# Patient Record
Sex: FEMALE | Race: WHITE | NOT HISPANIC OR LATINO | Employment: OTHER | URBAN - METROPOLITAN AREA
[De-identification: names, ages, dates, MRNs, and addresses within clinical notes are randomized per-mention and may not be internally consistent; named-entity substitution may affect disease eponyms.]

---

## 2017-03-01 ENCOUNTER — LAB VISIT (OUTPATIENT)
Dept: LAB | Facility: HOSPITAL | Age: 72
End: 2017-03-01
Attending: INTERNAL MEDICINE
Payer: MEDICARE

## 2017-03-01 DIAGNOSIS — R53.83 FATIGUE, UNSPECIFIED TYPE: ICD-10-CM

## 2017-03-01 DIAGNOSIS — E78.9 LIPID DISORDER: ICD-10-CM

## 2017-03-01 LAB
ALBUMIN SERPL BCP-MCNC: 3.5 G/DL
ALP SERPL-CCNC: 89 U/L
ALT SERPL W/O P-5'-P-CCNC: 16 U/L
ANION GAP SERPL CALC-SCNC: 7 MMOL/L
AST SERPL-CCNC: 20 U/L
BASOPHILS # BLD AUTO: 0.06 K/UL
BASOPHILS NFR BLD: 0.8 %
BILIRUB SERPL-MCNC: 0.7 MG/DL
BUN SERPL-MCNC: 16 MG/DL
CALCIUM SERPL-MCNC: 9.5 MG/DL
CHLORIDE SERPL-SCNC: 103 MMOL/L
CHOLEST/HDLC SERPL: 2.7 {RATIO}
CO2 SERPL-SCNC: 29 MMOL/L
CREAT SERPL-MCNC: 0.8 MG/DL
DIFFERENTIAL METHOD: NORMAL
EOSINOPHIL # BLD AUTO: 0.4 K/UL
EOSINOPHIL NFR BLD: 4.5 %
ERYTHROCYTE [DISTWIDTH] IN BLOOD BY AUTOMATED COUNT: 13.7 %
EST. GFR  (AFRICAN AMERICAN): >60 ML/MIN/1.73 M^2
EST. GFR  (NON AFRICAN AMERICAN): >60 ML/MIN/1.73 M^2
GLUCOSE SERPL-MCNC: 80 MG/DL
HCT VFR BLD AUTO: 42.9 %
HDL/CHOLESTEROL RATIO: 36.4 %
HDLC SERPL-MCNC: 184 MG/DL
HDLC SERPL-MCNC: 67 MG/DL
HGB BLD-MCNC: 14.1 G/DL
LDLC SERPL CALC-MCNC: 93 MG/DL
LYMPHOCYTES # BLD AUTO: 2.2 K/UL
LYMPHOCYTES NFR BLD: 29.1 %
MCH RBC QN AUTO: 30.2 PG
MCHC RBC AUTO-ENTMCNC: 32.9 %
MCV RBC AUTO: 92 FL
MONOCYTES # BLD AUTO: 0.7 K/UL
MONOCYTES NFR BLD: 9.5 %
NEUTROPHILS # BLD AUTO: 4.3 K/UL
NEUTROPHILS NFR BLD: 55.7 %
NONHDLC SERPL-MCNC: 117 MG/DL
PLATELET # BLD AUTO: 280 K/UL
PMV BLD AUTO: 10.8 FL
POTASSIUM SERPL-SCNC: 4.1 MMOL/L
PROT SERPL-MCNC: 6.9 G/DL
RBC # BLD AUTO: 4.67 M/UL
SODIUM SERPL-SCNC: 139 MMOL/L
T4 FREE SERPL-MCNC: 1.06 NG/DL
TRIGL SERPL-MCNC: 120 MG/DL
TSH SERPL DL<=0.005 MIU/L-ACNC: 1.34 UIU/ML
WBC # BLD AUTO: 7.7 K/UL

## 2017-03-01 PROCEDURE — 80053 COMPREHEN METABOLIC PANEL: CPT

## 2017-03-01 PROCEDURE — 85025 COMPLETE CBC W/AUTO DIFF WBC: CPT

## 2017-03-01 PROCEDURE — 84443 ASSAY THYROID STIM HORMONE: CPT

## 2017-03-01 PROCEDURE — 36415 COLL VENOUS BLD VENIPUNCTURE: CPT | Mod: PO

## 2017-03-01 PROCEDURE — 80061 LIPID PANEL: CPT

## 2017-03-01 PROCEDURE — 84439 ASSAY OF FREE THYROXINE: CPT

## 2017-03-07 ENCOUNTER — OFFICE VISIT (OUTPATIENT)
Dept: INTERNAL MEDICINE | Facility: CLINIC | Age: 72
End: 2017-03-07
Payer: MEDICARE

## 2017-03-07 VITALS
WEIGHT: 147.69 LBS | HEART RATE: 64 BPM | DIASTOLIC BLOOD PRESSURE: 72 MMHG | RESPIRATION RATE: 16 BRPM | SYSTOLIC BLOOD PRESSURE: 150 MMHG | BODY MASS INDEX: 27.88 KG/M2 | HEIGHT: 61 IN | TEMPERATURE: 98 F

## 2017-03-07 DIAGNOSIS — E78.9 LIPID DISORDER: ICD-10-CM

## 2017-03-07 DIAGNOSIS — Z23 NEED FOR VACCINATION WITH 13-POLYVALENT PNEUMOCOCCAL CONJUGATE VACCINE: ICD-10-CM

## 2017-03-07 DIAGNOSIS — J34.9 SINUS DISORDER: ICD-10-CM

## 2017-03-07 DIAGNOSIS — Z12.31 ENCOUNTER FOR SCREENING MAMMOGRAM FOR MALIGNANT NEOPLASM OF BREAST: ICD-10-CM

## 2017-03-07 DIAGNOSIS — Z00.00 ANNUAL PHYSICAL EXAM: Primary | ICD-10-CM

## 2017-03-07 DIAGNOSIS — Z12.39 SCREENING FOR BREAST CANCER: ICD-10-CM

## 2017-03-07 PROCEDURE — 90670 PCV13 VACCINE IM: CPT | Mod: PBBFAC,PO | Performed by: INTERNAL MEDICINE

## 2017-03-07 PROCEDURE — 99999 PR PBB SHADOW E&M-EST. PATIENT-LVL III: CPT | Mod: PBBFAC,,, | Performed by: INTERNAL MEDICINE

## 2017-03-07 PROCEDURE — 99213 OFFICE O/P EST LOW 20 MIN: CPT | Mod: PBBFAC,PO,25 | Performed by: INTERNAL MEDICINE

## 2017-03-07 PROCEDURE — 99214 OFFICE O/P EST MOD 30 MIN: CPT | Mod: S$PBB,,, | Performed by: INTERNAL MEDICINE

## 2017-03-07 NOTE — MR AVS SNAPSHOT
[place] [facility]   [facility]  [place] [address]  Phone: [phone]  Fax: [phone]                  Laurie Hicks   3/7/2017 10:00 AM   Office Visit    Description:  Female : 1945   Provider:  Narinder Saldivar MD   Department:  [place] [facility]           Reason for Visit     Annual Exam           Diagnoses this Visit        Comments    Annual physical exam    -  Primary     Lipid disorder         Sinus disorder         Need for vaccination with 13-polyvalent pneumococcal conjugate vaccine         Screening for breast cancer         Encounter for screening mammogram for malignant neoplasm of breast                To Do List           Future Appointments        Provider Department Dept Phone    3/7/2017 10:00 AM Narinder Saldivar MD [facility] [phone]    3/15/2017 10:00 AM [facility] [phone]      Goals (5 Years of Data)     None      Follow-Up and Disposition     Return in about 1 year (around 3/7/2018).      [facility]     [facility] -  Assistance  Registered nurses in the [facility] provide clinical advisement, health education, appointment booking, and other advisory services.  Call for this free service at [phone].             Medications           Message regarding Medications     Verify the changes and/or additions to your medication regime listed below are the same as discussed with your clinician today.  If any of these changes or additions are incorrect, please notify your healthcare provider.             Verify that the below list of medications is an accurate representation of the medications you are currently taking.  If none reported, the list may be blank. If incorrect, please contact your healthcare provider. Carry this list with you in case of emergency.           Current Medications     aspirin (ECOTRIN) 81 MG EC tablet Take 81  "mg by mouth once daily.    benzonatate (TESSALON) 200 MG capsule Take 200 mg by mouth 3 (three) times daily as needed for Cough.    levocetirizine (XYZAL) 5 MG tablet Take 5 mg by mouth every evening.    multivitamin (THERAGRAN) per tablet Take 1 tablet by mouth once daily.    ipratropium (ATROVENT) 0.06 % nasal spray            Clinical Reference Information           Your Vitals Were     BP Pulse Temp Resp Height Weight    150/72 (BP Location: Right arm, Patient Position: Sitting, BP Method: Manual) 64 98.3 °F (36.8 °C) (Oral) 16 5' 1" (1.549 m) 67 kg (147 lb 11.3 oz)    BMI                27.91 kg/m2          Blood Pressure          Most Recent Value    BP  (!)  150/72      Allergies as of 3/7/2017     Pcn [Penicillins]    Sulfa (Sulfonamide Antibiotics)      Immunizations Administered on Date of Encounter - 3/7/2017     Name Date Dose VIS Date Route    Pneumococcal Conjugate - 13 Valent 3/7/2017 0.5 mL 11/5/2015 Intramuscular      Orders Placed During Today's Visit      Normal Orders This Visit    Pneumococcal Conjugate Vaccine (13 Valent) (IM)     Future Labs/Procedures Expected by Expires    Mammo Digital Screening Bilat with CAD  3/7/2017 5/8/2018      Language Assistance Services     ATTENTION: Language assistance services are available, free of charge. Please call [phone].      ATENCIÓN: Si habla anders, tiene a watt disposición servicios gratuitos de asistencia lingüística. Llame al [phone].     [facility] Ý: N?u b?n nói Ti?ng Vi?t, có các d?ch v? h? tr? ngôn ng? mi?n phí dành cho b?n. G?i s? [phone].         [place] [facility] complies with applicable Federal civil rights laws and does not discriminate on the basis of race, color, national origin, age, disability, or sex.        "

## 2017-03-07 NOTE — PROGRESS NOTES
Subjective:       Patient ID: Laurie Hicks is a 71 y.o. female.    Chief Complaint: Annual Exam (with labs to review)  HISTORY OF PRESENT ILLNESS:  A 71-year-old white female patient of mine coming   in for annual review and has been feeling well.  She sees Dr. Whitman, who is the   ENT at [facility] for allergies and infections.  She wanted Prevnar, which   was ordered.  She had lab work done prior to this visit showing normal thyroid   function.  Chemistries, CBC, lipids and urinalysis.    PHYSICAL EXAMINATION:  VITAL SIGNS:  Normal.  SKIN:  Fair and healthy.  HEENT:  Clear.  NECK:  Shows no adenopathy, thyroid enlargement or bruit.  CHEST:  Clear.  HEART:  There is no murmur or gallop.  ABDOMEN:  Nontender without any organomegaly.  BREASTS:  Examined.  She has no mass to palpation.  EXTREMITIES:  Show normal muscles, joints, pulses.  NEUROLOGIC:  Normal.    She does have spider veins on her left leg, which she does not like, but they   are not giving her any symptoms, and I told her they were best left alone.    IMPRESSION:  1.  Spider veins.  2.  Sinus allergy.  3.  Normal physical exam.    I will see her again in one year.      KIERAN/BRIAN  dd: 03/12/2017 22:24:49 (CDT)  td: 03/13/2017 07:07:11 (CDT)  Doc ID   #[id number]  Job ID #[id number]    CC:     Dict [id number]  [facility] [place]  Review of Systems   Constitutional: Negative.    HENT: Negative for congestion, hearing loss, sinus pressure, sneezing, sore throat and voice change.    Eyes: Negative for visual disturbance.   Respiratory: Negative for cough, chest tightness and shortness of breath.    Cardiovascular: Negative.  Negative for chest pain, palpitations and leg swelling.   Gastrointestinal: Negative.    Genitourinary: Negative for difficulty urinating, dysuria, flank pain, frequency, hematuria, menstrual problem, pelvic pain, urgency, vaginal bleeding and vaginal discharge.   Musculoskeletal: Negative.  Negative for neck pain and neck stiffness.   Skin: Negative.     Neurological: Negative.  Negative for dizziness, seizures, light-headedness, numbness and headaches.   Psychiatric/Behavioral: Negative for agitation, behavioral problems, confusion and sleep disturbance. The patient is not nervous/anxious.        Objective:      Physical Exam   Constitutional: She is oriented to person, place, and time. She appears well-developed and well-nourished.   Eyes: EOM are normal. Pupils are equal, round, and reactive to light.   Neck: Normal range of motion. Neck supple. No JVD present. No thyromegaly present.   Cardiovascular: Normal rate, regular rhythm, normal heart sounds and intact distal pulses.  Exam reveals no gallop.    No murmur heard.  Pulmonary/Chest: Breath sounds normal. She has no wheezes. She has no rales.   Abdominal: Soft. Bowel sounds are normal. She exhibits no mass. There is no tenderness.   Musculoskeletal: Normal range of motion.   Lymphadenopathy:     She has no cervical adenopathy.   Neurological: She is alert and oriented to person, place, and time. She has normal reflexes. No cranial nerve deficit.   Skin: No rash noted. No erythema.   Psychiatric: She has a normal mood and affect. Judgment normal.       Assessment:       1. Annual physical exam    2. Lipid disorder    3. Sinus disorder        Plan:

## 2017-03-15 ENCOUNTER — HOSPITAL ENCOUNTER (OUTPATIENT)
Dept: RADIOLOGY | Facility: HOSPITAL | Age: 72
Discharge: HOME | End: 2017-03-15
Attending: INTERNAL MEDICINE
Payer: MEDICARE

## 2017-03-15 DIAGNOSIS — Z12.39 SCREENING FOR BREAST CANCER: ICD-10-CM

## 2017-03-15 DIAGNOSIS — Z12.31 ENCOUNTER FOR SCREENING MAMMOGRAM FOR MALIGNANT NEOPLASM OF BREAST: ICD-10-CM

## 2017-03-15 PROCEDURE — 77067 SCR MAMMO BI INCL CAD: CPT | Mod: TC

## 2017-03-15 PROCEDURE — 77067 SCR MAMMO BI INCL CAD: CPT | Mod: 26,,, | Performed by: RADIOLOGY

## 2017-03-15 PROCEDURE — 77063 BREAST TOMOSYNTHESIS BI: CPT | Mod: 26,,, | Performed by: RADIOLOGY

## 2017-11-21 ENCOUNTER — TELEPHONE (OUTPATIENT)
Dept: INTERNAL MEDICINE | Facility: CLINIC | Age: 72
End: 2017-11-21

## 2017-11-21 DIAGNOSIS — R53.83 FATIGUE, UNSPECIFIED TYPE: Primary | ICD-10-CM

## 2017-11-21 DIAGNOSIS — R79.9 ABNORMAL FINDING OF BLOOD CHEMISTRY: ICD-10-CM

## 2017-11-21 NOTE — TELEPHONE ENCOUNTER
----- Message from Jackie Raya sent at 11/21/2017 10:12 AM CST -----  Contact: self/[phone]  Patient called in regards needing to place an order for labs for physical on 03/12/18. Please call and advise.       Thank you

## 2018-01-16 ENCOUNTER — LAB VISIT (OUTPATIENT)
Dept: LAB | Facility: HOSPITAL | Age: 73
End: 2018-01-16
Attending: INTERNAL MEDICINE
Payer: MEDICARE

## 2018-01-16 DIAGNOSIS — R79.9 ABNORMAL FINDING OF BLOOD CHEMISTRY: ICD-10-CM

## 2018-01-16 DIAGNOSIS — R53.83 FATIGUE, UNSPECIFIED TYPE: ICD-10-CM

## 2018-01-16 LAB
ALBUMIN SERPL BCP-MCNC: 3.4 G/DL
ALP SERPL-CCNC: 100 U/L
ALT SERPL W/O P-5'-P-CCNC: 16 U/L
ANION GAP SERPL CALC-SCNC: 8 MMOL/L
AST SERPL-CCNC: 18 U/L
BASOPHILS # BLD AUTO: 0.05 K/UL
BASOPHILS NFR BLD: 0.7 %
BILIRUB SERPL-MCNC: 0.9 MG/DL
BUN SERPL-MCNC: 19 MG/DL
CALCIUM SERPL-MCNC: 9.7 MG/DL
CHLORIDE SERPL-SCNC: 103 MMOL/L
CHOLEST SERPL-MCNC: 185 MG/DL
CHOLEST/HDLC SERPL: 2.6 {RATIO}
CO2 SERPL-SCNC: 28 MMOL/L
CREAT SERPL-MCNC: 0.8 MG/DL
DIFFERENTIAL METHOD: ABNORMAL
EOSINOPHIL # BLD AUTO: 0.2 K/UL
EOSINOPHIL NFR BLD: 3.4 %
ERYTHROCYTE [DISTWIDTH] IN BLOOD BY AUTOMATED COUNT: 13 %
EST. GFR  (AFRICAN AMERICAN): >60 ML/MIN/1.73 M^2
EST. GFR  (NON AFRICAN AMERICAN): >60 ML/MIN/1.73 M^2
GLUCOSE SERPL-MCNC: 84 MG/DL
HCT VFR BLD AUTO: 43.2 %
HDLC SERPL-MCNC: 71 MG/DL
HDLC SERPL: 38.4 %
HGB BLD-MCNC: 14 G/DL
IMM GRANULOCYTES # BLD AUTO: 0.07 K/UL
IMM GRANULOCYTES NFR BLD AUTO: 1 %
LDLC SERPL CALC-MCNC: 96.4 MG/DL
LYMPHOCYTES # BLD AUTO: 1.5 K/UL
LYMPHOCYTES NFR BLD: 22.6 %
MCH RBC QN AUTO: 30 PG
MCHC RBC AUTO-ENTMCNC: 32.4 G/DL
MCV RBC AUTO: 93 FL
MONOCYTES # BLD AUTO: 0.7 K/UL
MONOCYTES NFR BLD: 9.7 %
NEUTROPHILS # BLD AUTO: 4.2 K/UL
NEUTROPHILS NFR BLD: 62.6 %
NONHDLC SERPL-MCNC: 114 MG/DL
NRBC BLD-RTO: 0 /100 WBC
PLATELET # BLD AUTO: 335 K/UL
PMV BLD AUTO: 10.5 FL
POTASSIUM SERPL-SCNC: 4.5 MMOL/L
PROT SERPL-MCNC: 7.3 G/DL
RBC # BLD AUTO: 4.66 M/UL
SODIUM SERPL-SCNC: 139 MMOL/L
T4 FREE SERPL-MCNC: 1.2 NG/DL
TRIGL SERPL-MCNC: 88 MG/DL
TSH SERPL DL<=0.005 MIU/L-ACNC: 0.64 UIU/ML
WBC # BLD AUTO: 6.78 K/UL

## 2018-01-16 PROCEDURE — 84439 ASSAY OF FREE THYROXINE: CPT

## 2018-01-16 PROCEDURE — 80061 LIPID PANEL: CPT

## 2018-01-16 PROCEDURE — 80053 COMPREHEN METABOLIC PANEL: CPT

## 2018-01-16 PROCEDURE — 84443 ASSAY THYROID STIM HORMONE: CPT

## 2018-01-16 PROCEDURE — 85025 COMPLETE CBC W/AUTO DIFF WBC: CPT

## 2018-01-16 PROCEDURE — 36415 COLL VENOUS BLD VENIPUNCTURE: CPT | Mod: PO

## 2018-03-12 ENCOUNTER — OFFICE VISIT (OUTPATIENT)
Dept: INTERNAL MEDICINE | Facility: CLINIC | Age: 73
End: 2018-03-12
Payer: MEDICARE

## 2018-03-12 VITALS
TEMPERATURE: 98 F | HEART RATE: 71 BPM | WEIGHT: 147.94 LBS | RESPIRATION RATE: 18 BRPM | DIASTOLIC BLOOD PRESSURE: 83 MMHG | SYSTOLIC BLOOD PRESSURE: 148 MMHG | HEIGHT: 61 IN | BODY MASS INDEX: 27.93 KG/M2

## 2018-03-12 DIAGNOSIS — Z12.39 SCREENING FOR BREAST CANCER: ICD-10-CM

## 2018-03-12 DIAGNOSIS — J34.9 SINUS DISORDER: ICD-10-CM

## 2018-03-12 DIAGNOSIS — Z00.00 ANNUAL PHYSICAL EXAM: Primary | ICD-10-CM

## 2018-03-12 DIAGNOSIS — E78.9 LIPID DISORDER: ICD-10-CM

## 2018-03-12 PROCEDURE — 99214 OFFICE O/P EST MOD 30 MIN: CPT | Mod: S$PBB,,, | Performed by: INTERNAL MEDICINE

## 2018-03-12 PROCEDURE — 99999 PR PBB SHADOW E&M-EST. PATIENT-LVL III: CPT | Mod: PBBFAC,,, | Performed by: INTERNAL MEDICINE

## 2018-03-12 PROCEDURE — 99213 OFFICE O/P EST LOW 20 MIN: CPT | Mod: PBBFAC,PO | Performed by: INTERNAL MEDICINE

## 2018-03-19 ENCOUNTER — HOSPITAL ENCOUNTER (OUTPATIENT)
Dept: RADIOLOGY | Facility: HOSPITAL | Age: 73
Discharge: HOME | End: 2018-03-19
Attending: INTERNAL MEDICINE
Payer: MEDICARE

## 2018-03-19 DIAGNOSIS — Z12.39 SCREENING FOR BREAST CANCER: ICD-10-CM

## 2018-03-19 PROCEDURE — 77067 SCR MAMMO BI INCL CAD: CPT | Mod: TC,PO

## 2018-03-19 PROCEDURE — 77067 SCR MAMMO BI INCL CAD: CPT | Mod: 26,,, | Performed by: RADIOLOGY

## 2018-03-25 NOTE — PROGRESS NOTES
Subjective:       Patient ID: Laurie Hicks is a 72 y.o. female.    Chief Complaint: Annual Exam  HISTORY OF PRESENT ILLNESS:  A 72-year-old white female patient of mine coming   in for annual review and has been feeling well.  She had a lab done prior to   this showing normal thyroid function, chemistries, lipids, CBC, urinalysis and   at the time of the visit had a mammogram ordered, which was done following the   visit and that was negative.  Lab was reviewed with the patient.    PHYSICAL EXAMINATION:  VITAL SIGNS:  Normal.  SKIN:  Fair and healthy.  HEENT:  Clear.  NECK:  Shows no adenopathy, thyroid enlargement or bruit.  CHEST:  Clear.  HEART:  There is no murmur or gallop.  BREASTS:  Examined.  She has no mass to palpation.  ABDOMEN:  Nontender without any organomegaly.  EXTREMITIES:  Show normal muscles, joints, pulses.  NEUROLOGIC:  Normal.    IMPRESSION:  1.  Spider veins on her legs.  No significance.  2.  Sinus allergy.  3.  Otherwise, normal exam.  I will see her again in one year if all is well.      KIERAN/BRIAN  dd: 03/25/2018 18:31:23 (CDT)  td: 03/25/2018 23:32:55 (CDT)  Doc ID   #[id number]  Job ID #[id number]    CC:     Dict [id number]  HPI  Review of Systems   Constitutional: Negative.    HENT: Negative for congestion, hearing loss, sinus pressure, sneezing, sore throat and voice change.    Eyes: Negative for visual disturbance.   Respiratory: Negative for cough, chest tightness and shortness of breath.    Cardiovascular: Negative.  Negative for chest pain, palpitations and leg swelling.   Gastrointestinal: Negative.    Genitourinary: Negative for difficulty urinating, dysuria, flank pain, frequency, hematuria, menstrual problem, pelvic pain, urgency, vaginal bleeding and vaginal discharge.   Musculoskeletal: Negative.  Negative for neck pain and neck stiffness.   Skin: Negative.    Neurological: Negative.  Negative for dizziness, seizures, light-headedness, numbness and headaches.    Psychiatric/Behavioral: Negative for agitation, behavioral problems, confusion and sleep disturbance. The patient is not nervous/anxious.        Objective:      Physical Exam   Constitutional: She is oriented to person, place, and time. She appears well-developed and well-nourished.   Eyes: EOM are normal. Pupils are equal, round, and reactive to light.   Neck: Normal range of motion. Neck supple. No JVD present. No thyromegaly present.   Cardiovascular: Normal rate, regular rhythm, normal heart sounds and intact distal pulses.  Exam reveals no gallop.    No murmur heard.  Pulmonary/Chest: Breath sounds normal. She has no wheezes. She has no rales.   Abdominal: Soft. Bowel sounds are normal. She exhibits no mass. There is no tenderness.   Musculoskeletal: Normal range of motion.   Lymphadenopathy:     She has no cervical adenopathy.   Neurological: She is alert and oriented to person, place, and time. She has normal reflexes. No cranial nerve deficit.   Skin: No rash noted. No erythema.   Psychiatric: She has a normal mood and affect. Judgment normal.       Assessment:       1. Annual physical exam    2. Lipid disorder    3. Sinus disorder    4. Screening for breast cancer        Plan:

## 2018-04-06 ENCOUNTER — OFFICE VISIT (OUTPATIENT)
Dept: DERMATOLOGY | Facility: CLINIC | Age: 73
End: 2018-04-06
Payer: MEDICARE

## 2018-04-06 DIAGNOSIS — L23.9 ALLERGIC CONTACT DERMATITIS, UNSPECIFIED TRIGGER: Primary | ICD-10-CM

## 2018-04-06 PROCEDURE — 99999 PR PBB SHADOW E&M-EST. PATIENT-LVL II: CPT | Mod: PBBFAC,,, | Performed by: DERMATOLOGY

## 2018-04-06 PROCEDURE — 99213 OFFICE O/P EST LOW 20 MIN: CPT | Mod: S$PBB,,, | Performed by: DERMATOLOGY

## 2018-04-06 PROCEDURE — 99212 OFFICE O/P EST SF 10 MIN: CPT | Mod: PBBFAC | Performed by: DERMATOLOGY

## 2018-04-06 RX ORDER — FLUOCINONIDE TOPICAL SOLUTION USP, 0.05% 0.5 MG/ML
SOLUTION TOPICAL
Qty: 60 ML | Refills: 3 | Status: SHIPPED | OUTPATIENT
Start: 2018-04-06 | End: 2019-03-18

## 2018-04-06 NOTE — PROGRESS NOTES
Subjective:       Patient ID:  Laurie Hicks is a 72 y.o. female who presents for   Chief Complaint   Patient presents with    Spot     Scalp     Spot  - Initial  Affected locations: scalp  Duration: 4 months  Signs / symptoms: asymptomatic  Aggravated by: nothing  Relieving factors/Treatments tried: nothing  Improvement on treatment: no relief      Pt states she had a spider bite on her scalp and after that, her  noticed a small area of hair loss.  Only minor itching/irritation. Pt hasn't noticed any hair loss.  She thinks her  recently changed products.    Review of Systems   Constitutional: Negative for fever, chills and fatigue.   Skin: Positive for daily sunscreen use.   Hematologic/Lymphatic: Bruises/bleeds easily.        Objective:    Physical Exam   Constitutional: She appears well-developed and well-nourished. No distress.   Neurological: She is alert and oriented to person, place, and time. She is not disoriented.   Psychiatric: She has a normal mood and affect.   Skin:   Areas Examined (abnormalities noted in diagram):   Scalp / Hair Palpated and Inspected  Head / Face Inspection Performed              Diagram Legend     Erythematous scaling macule/papule c/w actinic keratosis       Vascular papule c/w angioma      Pigmented verrucoid papule/plaque c/w seborrheic keratosis      Yellow umbilicated papule c/w sebaceous hyperplasia      Irregularly shaped tan macule c/w lentigo     1-2 mm smooth white papules consistent with Milia      Movable subcutaneous cyst with punctum c/w epidermal inclusion cyst      Subcutaneous movable cyst c/w pilar cyst      Firm pink to brown papule c/w dermatofibroma      Pedunculated fleshy papule(s) c/w skin tag(s)      Evenly pigmented macule c/w junctional nevus     Mildly variegated pigmented, slightly irregular-bordered macule c/w mildly atypical nevus      Flesh colored to evenly pigmented papule c/w intradermal nevus       Pink pearly  papule/plaque c/w basal cell carcinoma      Erythematous hyperkeratotic cursted plaque c/w SCC      Surgical scar with no sign of skin cancer recurrence      Open and closed comedones      Inflammatory papules and pustules      Verrucoid papule consistent consistent with wart     Erythematous eczematous patches and plaques     Dystrophic onycholytic nail with subungual debris c/w onychomycosis     Umbilicated papule    Erythematous-base heme-crusted tan verrucoid plaque consistent with inflamed seborrheic keratosis     Erythematous Silvery Scaling Plaque c/w Psoriasis     See annotation      Assessment / Plan:        Allergic contact dermatitis vs. Other -scalp  -     fluocinonide (LIDEX) 0.05 % external solution; AAA scalp qday - bid prn pruritus  Dispense: 60 mL; Refill: 3  Rec: trying Free and Clear shampoo    If no improvement in 2-3 months or if pt starts noticing hair loss, she will return for biopsy

## 2018-09-06 ENCOUNTER — TELEPHONE (OUTPATIENT)
Dept: INTERNAL MEDICINE | Facility: CLINIC | Age: 73
End: 2018-09-06

## 2018-09-06 NOTE — TELEPHONE ENCOUNTER
We received notice from [facility] & [facility] she got her flu and prevnar 13 shots.    I left her message that she does not need anymore pneumonia vaccines.  She actually got prevnar twice and is covered for pneumonia vaccines for lifetime.    I also states  needs the older pneumonia vaccine only when he can get. He already had the prevnar 13 one.

## 2018-11-23 ENCOUNTER — TELEPHONE (OUTPATIENT)
Dept: INTERNAL MEDICINE | Facility: CLINIC | Age: 73
End: 2018-11-23

## 2018-11-23 DIAGNOSIS — E78.9 LIPID DISORDER: ICD-10-CM

## 2018-11-23 DIAGNOSIS — R53.83 FATIGUE, UNSPECIFIED TYPE: Primary | ICD-10-CM

## 2018-11-23 NOTE — TELEPHONE ENCOUNTER
----- Message from Seda Guardado sent at 11/23/2018  9:19 AM CST -----  Contact: self  Doctor appointment and lab have been scheduled.  Please link lab orders to the lab appointment.  Date of doctor appointment:  3/18  Physical or EP:  phys  Date of lab appointment:  3/11  Comments:

## 2019-02-07 ENCOUNTER — TELEPHONE (OUTPATIENT)
Dept: DERMATOLOGY | Facility: CLINIC | Age: 74
End: 2019-02-07

## 2019-02-07 NOTE — TELEPHONE ENCOUNTER
Spoke with pt and got her scheduled to come in for an urgent rash on her face for tomorrow with Dr. Hicks.

## 2019-02-07 NOTE — TELEPHONE ENCOUNTER
----- Message from Vivien Diaz sent at 2/7/2019 10:44 AM CST -----  Contact: pt   Patient Requesting Sooner Appointment.     Reason for sooner appt.: pt is coming in for a rash on her face pt said her face is swollen started on her cheek and going down her face   When is the first available appointment? 3/28/2019  Communication Preference: pt would like to be seen tomorrow can you please call pt at [phone]  Additional Information: none    HEATHER

## 2019-02-08 ENCOUNTER — OFFICE VISIT (OUTPATIENT)
Dept: DERMATOLOGY | Facility: CLINIC | Age: 74
End: 2019-02-08
Payer: MEDICARE

## 2019-02-08 DIAGNOSIS — L23.9 ALLERGIC CONTACT DERMATITIS, UNSPECIFIED TRIGGER: Primary | ICD-10-CM

## 2019-02-08 PROCEDURE — 99212 OFFICE O/P EST SF 10 MIN: CPT | Mod: PBBFAC | Performed by: DERMATOLOGY

## 2019-02-08 PROCEDURE — 99213 OFFICE O/P EST LOW 20 MIN: CPT | Mod: S$PBB,,, | Performed by: DERMATOLOGY

## 2019-02-08 PROCEDURE — 99213 PR OFFICE/OUTPT VISIT, EST, LEVL III, 20-29 MIN: ICD-10-PCS | Mod: S$PBB,,, | Performed by: DERMATOLOGY

## 2019-02-08 PROCEDURE — 99999 PR PBB SHADOW E&M-EST. PATIENT-LVL II: CPT | Mod: PBBFAC,,, | Performed by: DERMATOLOGY

## 2019-02-08 PROCEDURE — 99999 PR PBB SHADOW E&M-EST. PATIENT-LVL II: ICD-10-PCS | Mod: PBBFAC,,, | Performed by: DERMATOLOGY

## 2019-02-08 RX ORDER — ALCLOMETASONE DIPROPIONATE 0.5 MG/G
CREAM TOPICAL 2 TIMES DAILY
Qty: 45 G | Refills: 1 | Status: SHIPPED | OUTPATIENT
Start: 2019-02-08

## 2019-02-08 NOTE — PROGRESS NOTES
Subjective:       Patient ID:  Laurie Hicks is a 73 y.o. female who presents for   Chief Complaint   Patient presents with    Rash     FAce, x 2 days, ice packs for treatment and neospoirn, swelling and redness     Rash  - Initial  Affected locations: face  Duration: 2 days  Signs / symptoms: swelling and redness  Aggravated by: nothing  Relieving factors/Treatments tried: OTC antibiotic cream (ice packs)  Improvement on treatment: significant      Rash has improved much on its own. Pt unaware of any triggers.    Review of Systems   Constitutional: Negative for fever, chills, weight loss, weight gain, fatigue, night sweats and malaise.   Skin: Positive for rash and recent sunburn. Negative for daily sunscreen use and activity-related sunscreen use.   Hematologic/Lymphatic: Does not bruise/bleed easily.        Objective:    Physical Exam   Constitutional: She appears well-developed and well-nourished. No distress.   Neurological: She is alert and oriented to person, place, and time. She is not disoriented.   Psychiatric: She has a normal mood and affect.   Skin:   Areas Examined (abnormalities noted in diagram):   Head / Face Inspection Performed  Neck Inspection Performed              Diagram Legend     Erythematous scaling macule/papule c/w actinic keratosis       Vascular papule c/w angioma      Pigmented verrucoid papule/plaque c/w seborrheic keratosis      Yellow umbilicated papule c/w sebaceous hyperplasia      Irregularly shaped tan macule c/w lentigo     1-2 mm smooth white papules consistent with Milia      Movable subcutaneous cyst with punctum c/w epidermal inclusion cyst      Subcutaneous movable cyst c/w pilar cyst      Firm pink to brown papule c/w dermatofibroma      Pedunculated fleshy papule(s) c/w skin tag(s)      Evenly pigmented macule c/w junctional nevus     Mildly variegated pigmented, slightly irregular-bordered macule c/w mildly atypical nevus      Flesh colored to evenly  pigmented papule c/w intradermal nevus       Pink pearly papule/plaque c/w basal cell carcinoma      Erythematous hyperkeratotic cursted plaque c/w SCC      Surgical scar with no sign of skin cancer recurrence      Open and closed comedones      Inflammatory papules and pustules      Verrucoid papule consistent consistent with wart     Erythematous eczematous patches and plaques     Dystrophic onycholytic nail with subungual debris c/w onychomycosis     Umbilicated papule    Erythematous-base heme-crusted tan verrucoid plaque consistent with inflamed seborrheic keratosis     Erythematous Silvery Scaling Plaque c/w Psoriasis     See annotation      Assessment / Plan:        Allergic contact dermatitis, unspecified trigger  -     alclomethasone (ACLOVATE) 0.05 % cream; Apply topically 2 (two) times daily. X 1-2 wks then prn flares  Dispense: 45 g; Refill: 1    Vanicream products provided to patient.   After rash clears, may reintroduce one product at a time.    If rash continues to recur, consider patch testing.

## 2019-03-11 ENCOUNTER — LAB VISIT (OUTPATIENT)
Dept: LAB | Facility: HOSPITAL | Age: 74
End: 2019-03-11
Attending: INTERNAL MEDICINE
Payer: MEDICARE

## 2019-03-11 DIAGNOSIS — E78.9 LIPID DISORDER: ICD-10-CM

## 2019-03-11 DIAGNOSIS — R53.83 FATIGUE, UNSPECIFIED TYPE: ICD-10-CM

## 2019-03-11 LAB
ALBUMIN SERPL BCP-MCNC: 3.8 G/DL
ALP SERPL-CCNC: 85 U/L
ALT SERPL W/O P-5'-P-CCNC: 21 U/L
ANION GAP SERPL CALC-SCNC: 8 MMOL/L
AST SERPL-CCNC: 25 U/L
BASOPHILS # BLD AUTO: 0.09 K/UL
BASOPHILS NFR BLD: 1.2 %
BILIRUB SERPL-MCNC: 0.8 MG/DL
BUN SERPL-MCNC: 14 MG/DL
CALCIUM SERPL-MCNC: 10.1 MG/DL
CHLORIDE SERPL-SCNC: 102 MMOL/L
CHOLEST SERPL-MCNC: 191 MG/DL
CHOLEST/HDLC SERPL: 2.8 {RATIO}
CO2 SERPL-SCNC: 28 MMOL/L
CREAT SERPL-MCNC: 0.8 MG/DL
DIFFERENTIAL METHOD: ABNORMAL
EOSINOPHIL # BLD AUTO: 0.4 K/UL
EOSINOPHIL NFR BLD: 4.6 %
ERYTHROCYTE [DISTWIDTH] IN BLOOD BY AUTOMATED COUNT: 13 %
EST. GFR  (AFRICAN AMERICAN): >60 ML/MIN/1.73 M^2
EST. GFR  (NON AFRICAN AMERICAN): >60 ML/MIN/1.73 M^2
GLUCOSE SERPL-MCNC: 71 MG/DL
HCT VFR BLD AUTO: 44.5 %
HDLC SERPL-MCNC: 69 MG/DL
HDLC SERPL: 36.1 %
HGB BLD-MCNC: 14.5 G/DL
IMM GRANULOCYTES # BLD AUTO: 0.06 K/UL
IMM GRANULOCYTES NFR BLD AUTO: 0.8 %
LDLC SERPL CALC-MCNC: 99.6 MG/DL
LYMPHOCYTES # BLD AUTO: 2.6 K/UL
LYMPHOCYTES NFR BLD: 33.4 %
MCH RBC QN AUTO: 30.6 PG
MCHC RBC AUTO-ENTMCNC: 32.6 G/DL
MCV RBC AUTO: 94 FL
MONOCYTES # BLD AUTO: 0.8 K/UL
MONOCYTES NFR BLD: 10.2 %
NEUTROPHILS # BLD AUTO: 3.9 K/UL
NEUTROPHILS NFR BLD: 49.8 %
NONHDLC SERPL-MCNC: 122 MG/DL
NRBC BLD-RTO: 0 /100 WBC
PLATELET # BLD AUTO: 337 K/UL
PMV BLD AUTO: 10.7 FL
POTASSIUM SERPL-SCNC: 4.1 MMOL/L
PROT SERPL-MCNC: 7.2 G/DL
RBC # BLD AUTO: 4.74 M/UL
SODIUM SERPL-SCNC: 138 MMOL/L
T4 FREE SERPL-MCNC: 1.15 NG/DL
TRIGL SERPL-MCNC: 112 MG/DL
TSH SERPL DL<=0.005 MIU/L-ACNC: 0.93 UIU/ML
WBC # BLD AUTO: 7.76 K/UL

## 2019-03-11 PROCEDURE — 80053 COMPREHEN METABOLIC PANEL: CPT

## 2019-03-11 PROCEDURE — 84439 ASSAY OF FREE THYROXINE: CPT

## 2019-03-11 PROCEDURE — 80061 LIPID PANEL: CPT

## 2019-03-11 PROCEDURE — 84443 ASSAY THYROID STIM HORMONE: CPT

## 2019-03-11 PROCEDURE — 85025 COMPLETE CBC W/AUTO DIFF WBC: CPT

## 2019-03-11 PROCEDURE — 36415 COLL VENOUS BLD VENIPUNCTURE: CPT | Mod: PO

## 2019-03-18 ENCOUNTER — OFFICE VISIT (OUTPATIENT)
Dept: INTERNAL MEDICINE | Facility: CLINIC | Age: 74
End: 2019-03-18
Payer: MEDICARE

## 2019-03-18 VITALS
DIASTOLIC BLOOD PRESSURE: 84 MMHG | BODY MASS INDEX: 28.97 KG/M2 | TEMPERATURE: 97 F | SYSTOLIC BLOOD PRESSURE: 138 MMHG | WEIGHT: 153.44 LBS | HEART RATE: 78 BPM | HEIGHT: 61 IN

## 2019-03-18 DIAGNOSIS — E78.9 LIPID DISORDER: ICD-10-CM

## 2019-03-18 DIAGNOSIS — J34.9 SINUS DISORDER: ICD-10-CM

## 2019-03-18 DIAGNOSIS — Z00.00 ANNUAL PHYSICAL EXAM: Primary | ICD-10-CM

## 2019-03-18 DIAGNOSIS — R82.90 ABNORMAL URINALYSIS: ICD-10-CM

## 2019-03-18 PROCEDURE — 99213 OFFICE O/P EST LOW 20 MIN: CPT | Mod: PBBFAC,PO | Performed by: INTERNAL MEDICINE

## 2019-03-18 PROCEDURE — 99397 PR PREVENTIVE VISIT,EST,65 & OVER: ICD-10-PCS | Mod: S$PBB,,, | Performed by: INTERNAL MEDICINE

## 2019-03-18 PROCEDURE — 99999 PR PBB SHADOW E&M-EST. PATIENT-LVL III: ICD-10-PCS | Mod: PBBFAC,,, | Performed by: INTERNAL MEDICINE

## 2019-03-18 PROCEDURE — 99397 PER PM REEVAL EST PAT 65+ YR: CPT | Mod: S$PBB,,, | Performed by: INTERNAL MEDICINE

## 2019-03-18 PROCEDURE — 99999 PR PBB SHADOW E&M-EST. PATIENT-LVL III: CPT | Mod: PBBFAC,,, | Performed by: INTERNAL MEDICINE

## 2019-03-18 PROCEDURE — 87086 URINE CULTURE/COLONY COUNT: CPT

## 2019-03-18 NOTE — PROGRESS NOTES
Subjective:       Patient ID: Laurie Hicks is a 73 y.o. female.    Chief Complaint: Annual Exam (review labs)  HISTORY OF PRESENT ILLNESS:  A 73-year-old white female patient of mine coming   in for annual review.  She sees no other doctors except for ENT, who is Dr. Villanueva and that is for sinus problems.  She did see him earlier in the year   related to that.  The patient had lab work done prior to this, which was   reviewed with her showing some reactive changes in her urine, so a urine culture   was done and that was negative.  The patient had normal thyroid, CBC, lipids,   chemistries.  The patient has no family history of breast cancer or other cancer   and she prefers not to get a mammogram, so none was ordered, but I told her she   should have a breast exam, which she agreed to.    PHYSICAL EXAMINATION:  VITAL SIGNS:  Normal.  SKIN:  Fair and healthy.  HEENT:  Clear.  NECK:  Shows no adenopathy, thyroid enlargement, or bruit.  CHEST:  Clear.  HEART:  There is no murmur or gallop.  BREASTS:  Examined.  She has no mass to palpation.  ABDOMEN:  Nontender without any organomegaly.  EXTREMITIES:  Show normal muscles, joints and pulses.  She does have spider   veins.  NEUROLOGIC:  Normal.    IMPRESSION:  1.  Normal exam.  2.  Spider veins in her legs.  3.  Sinus allergy.  I will see her again in one year.      KIERAN/HN  dd: 03/25/2019 23:41:14 (CDT)  td: 03/26/2019 19:59:13 (CDT)  Doc ID   #[id number]  Job ID #[id number]    CC:     Dict [id number]  HPI  Review of Systems   Constitutional: Negative.    HENT: Negative for congestion, hearing loss, sinus pressure, sneezing, sore throat and voice change.    Eyes: Negative for visual disturbance.   Respiratory: Negative for cough, chest tightness and shortness of breath.    Cardiovascular: Negative.  Negative for chest pain, palpitations and leg swelling.   Gastrointestinal: Negative.    Genitourinary: Negative for difficulty urinating, dysuria, flank pain, frequency,  hematuria, menstrual problem, pelvic pain, urgency, vaginal bleeding and vaginal discharge.   Musculoskeletal: Negative.  Negative for neck pain and neck stiffness.   Skin: Negative.    Neurological: Negative.  Negative for dizziness, seizures, light-headedness, numbness and headaches.   Psychiatric/Behavioral: Negative for agitation, behavioral problems, confusion and sleep disturbance. The patient is not nervous/anxious.        Objective:      Physical Exam   Constitutional: She is oriented to person, place, and time. She appears well-developed and well-nourished.   Eyes: EOM are normal. Pupils are equal, round, and reactive to light.   Neck: Normal range of motion. Neck supple. No JVD present. No thyromegaly present.   Cardiovascular: Normal rate, regular rhythm, normal heart sounds and intact distal pulses. Exam reveals no gallop.   No murmur heard.  Pulmonary/Chest: Breath sounds normal. She has no wheezes. She has no rales.   Abdominal: Soft. Bowel sounds are normal. She exhibits no mass. There is no tenderness.   Musculoskeletal: Normal range of motion.   Lymphadenopathy:     She has no cervical adenopathy.   Neurological: She is alert and oriented to person, place, and time. She has normal reflexes. No cranial nerve deficit.   Skin: No rash noted. No erythema.   Psychiatric: She has a normal mood and affect. Judgment normal.       Assessment:       1. Annual physical exam    2. Lipid disorder    3. Sinus disorder        Plan:

## 2019-03-19 LAB — BACTERIA UR CULT: NO GROWTH

## 2019-03-22 ENCOUNTER — TELEPHONE (OUTPATIENT)
Dept: INTERNAL MEDICINE | Facility: CLINIC | Age: 74
End: 2019-03-22

## 2019-05-07 DIAGNOSIS — Z12.11 COLON CANCER SCREENING: ICD-10-CM

## 2019-09-12 ENCOUNTER — OFFICE VISIT (OUTPATIENT)
Dept: URGENT CARE | Facility: CLINIC | Age: 74
End: 2019-09-12
Payer: MEDICARE

## 2019-09-12 VITALS
WEIGHT: 140 LBS | SYSTOLIC BLOOD PRESSURE: 126 MMHG | HEART RATE: 79 BPM | OXYGEN SATURATION: 97 % | DIASTOLIC BLOOD PRESSURE: 79 MMHG | BODY MASS INDEX: 26.43 KG/M2 | RESPIRATION RATE: 18 BRPM | TEMPERATURE: 98 F | HEIGHT: 61 IN

## 2019-09-12 DIAGNOSIS — L25.9 CONTACT DERMATITIS, UNSPECIFIED CONTACT DERMATITIS TYPE, UNSPECIFIED TRIGGER: Primary | ICD-10-CM

## 2019-09-12 PROCEDURE — 99214 PR OFFICE/OUTPT VISIT, EST, LEVL IV, 30-39 MIN: ICD-10-PCS | Mod: 25,S$GLB,, | Performed by: FAMILY MEDICINE

## 2019-09-12 PROCEDURE — 96372 PR INJECTION,THERAP/PROPH/DIAG2ST, IM OR SUBCUT: ICD-10-PCS | Mod: S$GLB,,, | Performed by: FAMILY MEDICINE

## 2019-09-12 PROCEDURE — 96372 THER/PROPH/DIAG INJ SC/IM: CPT | Mod: S$GLB,,, | Performed by: FAMILY MEDICINE

## 2019-09-12 PROCEDURE — 99214 OFFICE O/P EST MOD 30 MIN: CPT | Mod: 25,S$GLB,, | Performed by: FAMILY MEDICINE

## 2019-09-12 RX ORDER — PANTOPRAZOLE SODIUM 40 MG/1
TABLET, DELAYED RELEASE ORAL
Refills: 2 | COMMUNITY
Start: 2019-07-03 | End: 2021-04-20 | Stop reason: SDUPTHER

## 2019-09-12 RX ORDER — BETAMETHASONE SODIUM PHOSPHATE AND BETAMETHASONE ACETATE 3; 3 MG/ML; MG/ML
6 INJECTION, SUSPENSION INTRA-ARTICULAR; INTRALESIONAL; INTRAMUSCULAR; SOFT TISSUE
Status: COMPLETED | OUTPATIENT
Start: 2019-09-12 | End: 2019-09-12

## 2019-09-12 RX ORDER — HYDROCORTISONE 25 MG/G
CREAM TOPICAL 2 TIMES DAILY
Qty: 3.5 G | Refills: 1 | Status: SHIPPED | OUTPATIENT
Start: 2019-09-12 | End: 2024-01-02 | Stop reason: SDUPTHER

## 2019-09-12 RX ADMIN — BETAMETHASONE SODIUM PHOSPHATE AND BETAMETHASONE ACETATE 6 MG: 3; 3 INJECTION, SUSPENSION INTRA-ARTICULAR; INTRALESIONAL; INTRAMUSCULAR; SOFT TISSUE at 02:09

## 2019-09-12 NOTE — PATIENT INSTRUCTIONS
Poison Ivy Rash  You have a rash and itching. This is a delayed reaction to the oils of the poison ivy plant. You likely came in contact with it during the 3 days before your symptoms began. Your skin will become red and itchy. Small blisters may appear. These can break and leak a clear yellow fluid. This fluid is not contagious. The reaction usually starts to go away after 1 to 2 weeks. But it may take 4 to 6 weeks to fully clear.    Home care  Follow these guidelines when caring for yourself at home:  · The plant oils still on your skin or clothes can be spread to other places on your body. They can also be passed on to other people and cause a similar reaction. Thats why its important to wash all of the plant oils off your skin and any clothes that may have been exposed. Wash all clothes that you were wearing. Use hot water with ordinary laundry detergent.  · Don't use over-the-counter creams that have neomycin or bacitracin. These may make the rash worse.  · Avoid anything that heats up your skin. This includes hot showers or baths, or direct sunlight. These can make itching worse.  · Put a cold compress on areas that are leaking (weeping), or on blistered areas. Do this for 30 minutes 3 times a day. To make a cold compress, dip a wash cloth in a mixture of 1 pint of cold water and 1 packet of astringent or oatmeal bath powder. Keep the solution in the refrigerator for future use.  · If large areas of skin are affected, take a lukewarm bath. Add colloidal oatmeal, or 1 cup of cornstarch or baking soda to the water.  · For a rash in a smaller area, use hydrocortisone cream for redness and irritation. But dont use this if another medicine was prescribed. For severe itching, put an ice pack on the area. To make an ice pack, put ice cubes in a plastic bag that seals at the top. Wrap the bag in a clean, thin towel or cloth. Never put ice or an ice pack directly on the skin. Over-the-counter products that have  calamine lotion may also be helpful.  · You can also use an oral antihistamine medicine with diphenhydramine for itching, unless another medicine was prescribed. This medicine may make you sleepy. So use lower doses during the daytime and higher doses at bedtime. Dont use medicine that has diphenhydramine if you have glaucoma. Also dont use it if you are a man who has trouble urinating because of an enlarged prostate. Antihistamines with loratidine cause less drowsiness. They are a good choice for daytime use.  · For severe cases, your provider may prescribe oral steroid medicines. Always take these exactly as prescribed.  Follow-up care  Follow up with your healthcare provider, or as directed. Call your provider if your rash gets worse or you are not starting to get better after 1 week of treatment.  When to seek medical advice  Call your healthcare provider right away if any of these occur:  · Spreading facial rash with swollen mouth or eyelids  · Rash that spreads to the groin and causes swelling of the penis, scrotum, or vaginal area  · Trouble urinating because of swelling in the genital area  Also call your provider if you have signs of infection in the areas of broken blisters:  · Spreading redness  · Pus or fluid draining from the blisters  · Yellow-brown crusts form over the open blisters  · Fever of 1 degree, or higher, above your normal temperature, or as directed by your provider  Call 911  Call 911 if you have severe swelling on your face, eyelids, mouth, throat, or tongue.  Date Last Reviewed: 8/1/2016  © 0005-2191 [employer]. [address] [place] [address], [place] [address]. All rights reserved. This information is not intended as a substitute for professional medical care. Always follow your healthcare professional's instructions.

## 2019-09-12 NOTE — PROGRESS NOTES
"Subjective:       Patient ID: Laurie Hicks is a 74 y.o. female.    Vitals:  height is 5' 1" (1.549 m) and weight is 63.5 kg (140 lb). Her oral temperature is 98.1 °F (36.7 °C). Her blood pressure is 126/79 and her pulse is 79. Her respiration is 18 and oxygen saturation is 97%.     Chief Complaint: Poison Carlota    This is a 74 y.o. female who presents today with a chief complaint of   Rash. Since Sunday. Pt was working in her yard and thinks she came in contact with poison sumac or poison ivy    Poison Ivy   This is a new problem. The current episode started in the past 7 days. The problem has been gradually worsening since onset. The affected locations include the face. The rash is characterized by itchiness, redness and swelling. She was exposed to plant contact. Pertinent negatives include no cough, fever or sore throat. Past treatments include anti-itch cream (calamine lotion ). The treatment provided no relief.       Constitution: Negative for chills and fever.   HENT: Negative for facial swelling and sore throat.    Neck: Negative for painful lymph nodes.   Eyes: Negative for eye itching and eyelid swelling.   Respiratory: Negative for cough.    Musculoskeletal: Negative for joint pain and joint swelling.   Skin: Positive for color change, rash and erythema (periorbital erythema and redness noted bilaterally). Negative for pale, wound, abrasion, laceration, lesion, skin thickening/induration, puncture wound, bruising, abscess, avulsion and hives.   Allergic/Immunologic: Negative for environmental allergies, immunocompromised state and hives.   Hematologic/Lymphatic: Negative for swollen lymph nodes.       Objective:      Physical Exam   Constitutional: She appears well-developed and well-nourished.   Eyes: Pupils are equal, round, and reactive to light. EOM are normal.   Cardiovascular: Normal rate and regular rhythm.   Skin: Rash (dry flaky) noted. There is erythema (periorbital erythema and redness " noted bilaterally).   Nursing note and vitals reviewed.      Assessment:       1. Contact dermatitis, unspecified contact dermatitis type, unspecified trigger        Plan:         Contact dermatitis, unspecified contact dermatitis type, unspecified trigger  -     hydrocortisone 2.5 % cream; Apply topically 2 (two) times daily.  Dispense: 3.5 g; Refill: 1  -     betamethasone acetate-betamethasone sodium phosphate injection 6 mg

## 2019-11-05 ENCOUNTER — TELEPHONE (OUTPATIENT)
Dept: INTERNAL MEDICINE | Facility: CLINIC | Age: 74
End: 2019-11-05

## 2019-11-05 DIAGNOSIS — Z00.00 ANNUAL PHYSICAL EXAM: Primary | ICD-10-CM

## 2019-11-05 NOTE — TELEPHONE ENCOUNTER
----- Message from Brianna Hernandez sent at 11/5/2019  8:32 AM CST -----  Contact: fyi  Doctor appointment and lab have been scheduled.  Please link lab orders to the lab appointment.  Date of doctor appointment:  03/19/20  Date of lab appointment:  30/11/20  Physical or EP: physical  Comments:

## 2019-12-03 ENCOUNTER — TELEPHONE (OUTPATIENT)
Dept: ADMINISTRATIVE | Facility: OTHER | Age: 74
End: 2019-12-03

## 2020-03-11 ENCOUNTER — LAB VISIT (OUTPATIENT)
Dept: LAB | Facility: HOSPITAL | Age: 75
End: 2020-03-11
Attending: INTERNAL MEDICINE
Payer: MEDICARE

## 2020-03-11 DIAGNOSIS — Z00.00 ANNUAL PHYSICAL EXAM: ICD-10-CM

## 2020-03-11 LAB
ALBUMIN SERPL BCP-MCNC: 3.6 G/DL (ref 3.5–5.2)
ALP SERPL-CCNC: 89 U/L (ref 55–135)
ALT SERPL W/O P-5'-P-CCNC: 17 U/L (ref 10–44)
ANION GAP SERPL CALC-SCNC: 8 MMOL/L (ref 8–16)
AST SERPL-CCNC: 22 U/L (ref 10–40)
BASOPHILS # BLD AUTO: 0.08 K/UL (ref 0–0.2)
BASOPHILS NFR BLD: 1.2 % (ref 0–1.9)
BILIRUB SERPL-MCNC: 0.8 MG/DL (ref 0.1–1)
BUN SERPL-MCNC: 12 MG/DL (ref 8–23)
CALCIUM SERPL-MCNC: 9.6 MG/DL (ref 8.7–10.5)
CHLORIDE SERPL-SCNC: 105 MMOL/L (ref 95–110)
CHOLEST SERPL-MCNC: 190 MG/DL (ref 120–199)
CHOLEST/HDLC SERPL: 2.6 {RATIO} (ref 2–5)
CO2 SERPL-SCNC: 28 MMOL/L (ref 23–29)
CREAT SERPL-MCNC: 0.8 MG/DL (ref 0.5–1.4)
DIFFERENTIAL METHOD: ABNORMAL
EOSINOPHIL # BLD AUTO: 0.3 K/UL (ref 0–0.5)
EOSINOPHIL NFR BLD: 4.2 % (ref 0–8)
ERYTHROCYTE [DISTWIDTH] IN BLOOD BY AUTOMATED COUNT: 13.3 % (ref 11.5–14.5)
EST. GFR  (AFRICAN AMERICAN): >60 ML/MIN/1.73 M^2
EST. GFR  (NON AFRICAN AMERICAN): >60 ML/MIN/1.73 M^2
GLUCOSE SERPL-MCNC: 79 MG/DL (ref 70–110)
HCT VFR BLD AUTO: 45.6 % (ref 37–48.5)
HDLC SERPL-MCNC: 73 MG/DL (ref 40–75)
HDLC SERPL: 38.4 % (ref 20–50)
HGB BLD-MCNC: 14.4 G/DL (ref 12–16)
IMM GRANULOCYTES # BLD AUTO: 0.06 K/UL (ref 0–0.04)
IMM GRANULOCYTES NFR BLD AUTO: 0.9 % (ref 0–0.5)
LDLC SERPL CALC-MCNC: 98.2 MG/DL (ref 63–159)
LYMPHOCYTES # BLD AUTO: 2 K/UL (ref 1–4.8)
LYMPHOCYTES NFR BLD: 30.9 % (ref 18–48)
MCH RBC QN AUTO: 30.6 PG (ref 27–31)
MCHC RBC AUTO-ENTMCNC: 31.6 G/DL (ref 32–36)
MCV RBC AUTO: 97 FL (ref 82–98)
MONOCYTES # BLD AUTO: 0.7 K/UL (ref 0.3–1)
MONOCYTES NFR BLD: 11 % (ref 4–15)
NEUTROPHILS # BLD AUTO: 3.3 K/UL (ref 1.8–7.7)
NEUTROPHILS NFR BLD: 51.8 % (ref 38–73)
NONHDLC SERPL-MCNC: 117 MG/DL
NRBC BLD-RTO: 0 /100 WBC
PLATELET # BLD AUTO: 323 K/UL (ref 150–350)
PMV BLD AUTO: 11 FL (ref 9.2–12.9)
POTASSIUM SERPL-SCNC: 4.3 MMOL/L (ref 3.5–5.1)
PROT SERPL-MCNC: 7.1 G/DL (ref 6–8.4)
RBC # BLD AUTO: 4.71 M/UL (ref 4–5.4)
SODIUM SERPL-SCNC: 141 MMOL/L (ref 136–145)
T4 FREE SERPL-MCNC: 1.05 NG/DL (ref 0.71–1.51)
TRIGL SERPL-MCNC: 94 MG/DL (ref 30–150)
TSH SERPL DL<=0.005 MIU/L-ACNC: 0.69 UIU/ML (ref 0.4–4)
WBC # BLD AUTO: 6.45 K/UL (ref 3.9–12.7)

## 2020-03-11 PROCEDURE — 80053 COMPREHEN METABOLIC PANEL: CPT

## 2020-03-11 PROCEDURE — 85025 COMPLETE CBC W/AUTO DIFF WBC: CPT

## 2020-03-11 PROCEDURE — 84439 ASSAY OF FREE THYROXINE: CPT

## 2020-03-11 PROCEDURE — 84443 ASSAY THYROID STIM HORMONE: CPT

## 2020-03-11 PROCEDURE — 36415 COLL VENOUS BLD VENIPUNCTURE: CPT | Mod: PO

## 2020-03-11 PROCEDURE — 80061 LIPID PANEL: CPT

## 2020-03-25 ENCOUNTER — TELEPHONE (OUTPATIENT)
Dept: ADMINISTRATIVE | Facility: HOSPITAL | Age: 75
End: 2020-03-25

## 2020-03-25 ENCOUNTER — TELEPHONE (OUTPATIENT)
Dept: INTERNAL MEDICINE | Facility: CLINIC | Age: 75
End: 2020-03-25

## 2020-03-25 NOTE — TELEPHONE ENCOUNTER
----- Message from Dorita Ma sent at 3/25/2020  1:41 PM CDT -----  Contact: Patient   Appt confirmed & travel screening completed.    Thank you

## 2020-03-26 ENCOUNTER — OFFICE VISIT (OUTPATIENT)
Dept: INTERNAL MEDICINE | Facility: CLINIC | Age: 75
End: 2020-03-26
Payer: MEDICARE

## 2020-03-26 VITALS
HEIGHT: 60 IN | WEIGHT: 151 LBS | SYSTOLIC BLOOD PRESSURE: 138 MMHG | HEART RATE: 78 BPM | TEMPERATURE: 98 F | BODY MASS INDEX: 29.64 KG/M2 | RESPIRATION RATE: 16 BRPM | DIASTOLIC BLOOD PRESSURE: 78 MMHG

## 2020-03-26 DIAGNOSIS — Z00.00 ANNUAL PHYSICAL EXAM: Primary | ICD-10-CM

## 2020-03-26 DIAGNOSIS — J34.9 SINUS DISORDER: ICD-10-CM

## 2020-03-26 PROCEDURE — 99397 PER PM REEVAL EST PAT 65+ YR: CPT | Mod: S$PBB,,, | Performed by: INTERNAL MEDICINE

## 2020-03-26 PROCEDURE — 99397 PR PREVENTIVE VISIT,EST,65 & OVER: ICD-10-PCS | Mod: S$PBB,,, | Performed by: INTERNAL MEDICINE

## 2020-03-26 PROCEDURE — 99999 PR PBB SHADOW E&M-EST. PATIENT-LVL III: CPT | Mod: PBBFAC,,, | Performed by: INTERNAL MEDICINE

## 2020-03-26 PROCEDURE — 99213 OFFICE O/P EST LOW 20 MIN: CPT | Mod: PBBFAC,PO | Performed by: INTERNAL MEDICINE

## 2020-03-26 PROCEDURE — 99999 PR PBB SHADOW E&M-EST. PATIENT-LVL III: ICD-10-PCS | Mod: PBBFAC,,, | Performed by: INTERNAL MEDICINE

## 2020-03-26 NOTE — PROGRESS NOTES
Subjective:       Patient ID: Laurie Hicks is a 74 y.o. female.    Chief Complaint: Annual Exam  Dictation #1  MRN:[id number]  CSN:[id number]  DICT 74-year-old white female patient mine comes in for annual review.  She is feeling well.  She has had some decline in her vision and has arrangements that her currently delayed because the corona virus to have Lasix surgery.  Patient is on virtually no medicine except for some sinus medication she takes irregularly for allergies.    Lab was done and normal    Physical exam:  Vital signs-normal  Skin-sparing clear  HEENT-clear  Neck-no adenopathy, thyroid enlargement, bruit  Chest-clear percussion auscultation  Breasts were examined she has no mass to palpation.  Mammogram is been delayed because of the corona virus  Abdomen-obese, nontender, no organomegaly, no mass, no pain, bowel sounds active  Extremities-normal muscles, joints, pulses  Neurologic-normal    Impression:  1.  Normal exam  2.  Visual change with plans for correction surgically  3.  Sinus allergy    Plan:  1.  I will see her again in 1 year 2.  Mammogram will be arranged when that is feasible with the corona virus  HPI  Review of Systems   Constitutional: Negative.    HENT: Positive for congestion and postnasal drip. Negative for hearing loss, sinus pressure, sneezing, sore throat and voice change.    Eyes: Positive for visual disturbance.   Respiratory: Negative for cough, chest tightness and shortness of breath.    Cardiovascular: Negative.  Negative for chest pain, palpitations and leg swelling.   Gastrointestinal: Negative.    Genitourinary: Negative for difficulty urinating, dysuria, flank pain, frequency, hematuria, menstrual problem, pelvic pain, urgency, vaginal bleeding and vaginal discharge.   Musculoskeletal: Negative.  Negative for neck pain and neck stiffness.   Skin: Negative.    Neurological: Negative.  Negative for dizziness, seizures, light-headedness, numbness and headaches.    Psychiatric/Behavioral: Negative for agitation, behavioral problems, confusion and sleep disturbance. The patient is not nervous/anxious.        Objective:          Assessment:       1. Annual physical exam    2. Sinus disorder        Plan:

## 2020-05-29 DIAGNOSIS — Z12.11 COLON CANCER SCREENING: ICD-10-CM

## 2020-10-02 ENCOUNTER — PATIENT OUTREACH (OUTPATIENT)
Dept: ADMINISTRATIVE | Facility: HOSPITAL | Age: 75
End: 2020-10-02

## 2020-10-02 DIAGNOSIS — Z12.31 ENCOUNTER FOR SCREENING MAMMOGRAM FOR BREAST CANCER: Primary | ICD-10-CM

## 2020-12-23 ENCOUNTER — TELEPHONE (OUTPATIENT)
Dept: INTERNAL MEDICINE | Facility: CLINIC | Age: 75
End: 2020-12-23

## 2020-12-23 DIAGNOSIS — Z00.00 ANNUAL PHYSICAL EXAM: Primary | ICD-10-CM

## 2020-12-23 NOTE — TELEPHONE ENCOUNTER
----- Message from Seda Guardado sent at 12/23/2020  2:19 PM CST -----  Contact: [phone]  Doctor appointment and lab have been scheduled.  Please link lab orders to the lab appointment.  Date of doctor appointment:  4/20  Date of lab appointment:  4/13  Physical or F/U: phys  Comments: scheduled patient with Dr. Mesa

## 2021-01-06 ENCOUNTER — IMMUNIZATION (OUTPATIENT)
Dept: PHARMACY | Facility: CLINIC | Age: 76
End: 2021-01-06

## 2021-01-06 DIAGNOSIS — Z23 NEED FOR VACCINATION: ICD-10-CM

## 2021-02-03 ENCOUNTER — IMMUNIZATION (OUTPATIENT)
Dept: PHARMACY | Facility: CLINIC | Age: 76
End: 2021-02-03
Payer: COMMERCIAL

## 2021-02-03 DIAGNOSIS — Z23 NEED FOR VACCINATION: Primary | ICD-10-CM

## 2021-03-04 ENCOUNTER — PATIENT OUTREACH (OUTPATIENT)
Dept: ADMINISTRATIVE | Facility: HOSPITAL | Age: 76
End: 2021-03-04

## 2021-03-18 ENCOUNTER — PATIENT MESSAGE (OUTPATIENT)
Dept: RESEARCH | Facility: HOSPITAL | Age: 76
End: 2021-03-18

## 2021-03-26 ENCOUNTER — PATIENT MESSAGE (OUTPATIENT)
Dept: RESEARCH | Facility: HOSPITAL | Age: 76
End: 2021-03-26

## 2021-04-05 ENCOUNTER — PATIENT MESSAGE (OUTPATIENT)
Dept: ADMINISTRATIVE | Facility: HOSPITAL | Age: 76
End: 2021-04-05

## 2021-04-13 ENCOUNTER — LAB VISIT (OUTPATIENT)
Dept: LAB | Facility: HOSPITAL | Age: 76
End: 2021-04-13
Attending: INTERNAL MEDICINE
Payer: MEDICARE

## 2021-04-13 DIAGNOSIS — Z00.00 ANNUAL PHYSICAL EXAM: ICD-10-CM

## 2021-04-13 LAB
ALBUMIN SERPL BCP-MCNC: 3.6 G/DL (ref 3.5–5.2)
ALP SERPL-CCNC: 93 U/L (ref 55–135)
ALT SERPL W/O P-5'-P-CCNC: 16 U/L (ref 10–44)
ANION GAP SERPL CALC-SCNC: 9 MMOL/L (ref 8–16)
AST SERPL-CCNC: 20 U/L (ref 10–40)
BASOPHILS # BLD AUTO: 0.07 K/UL (ref 0–0.2)
BASOPHILS NFR BLD: 1 % (ref 0–1.9)
BILIRUB SERPL-MCNC: 1.1 MG/DL (ref 0.1–1)
BUN SERPL-MCNC: 11 MG/DL (ref 8–23)
CALCIUM SERPL-MCNC: 9.3 MG/DL (ref 8.7–10.5)
CHLORIDE SERPL-SCNC: 104 MMOL/L (ref 95–110)
CHOLEST SERPL-MCNC: 183 MG/DL (ref 120–199)
CHOLEST/HDLC SERPL: 3.1 {RATIO} (ref 2–5)
CO2 SERPL-SCNC: 27 MMOL/L (ref 23–29)
CREAT SERPL-MCNC: 0.8 MG/DL (ref 0.5–1.4)
DIFFERENTIAL METHOD: ABNORMAL
EOSINOPHIL # BLD AUTO: 0.3 K/UL (ref 0–0.5)
EOSINOPHIL NFR BLD: 4.8 % (ref 0–8)
ERYTHROCYTE [DISTWIDTH] IN BLOOD BY AUTOMATED COUNT: 12.9 % (ref 11.5–14.5)
EST. GFR  (AFRICAN AMERICAN): >60 ML/MIN/1.73 M^2
EST. GFR  (NON AFRICAN AMERICAN): >60 ML/MIN/1.73 M^2
GLUCOSE SERPL-MCNC: 78 MG/DL (ref 70–110)
HCT VFR BLD AUTO: 45 % (ref 37–48.5)
HDLC SERPL-MCNC: 60 MG/DL (ref 40–75)
HDLC SERPL: 32.8 % (ref 20–50)
HGB BLD-MCNC: 14.6 G/DL (ref 12–16)
IMM GRANULOCYTES # BLD AUTO: 0.04 K/UL (ref 0–0.04)
IMM GRANULOCYTES NFR BLD AUTO: 0.6 % (ref 0–0.5)
LDLC SERPL CALC-MCNC: 97 MG/DL (ref 63–159)
LYMPHOCYTES # BLD AUTO: 1.7 K/UL (ref 1–4.8)
LYMPHOCYTES NFR BLD: 24.2 % (ref 18–48)
MCH RBC QN AUTO: 30.9 PG (ref 27–31)
MCHC RBC AUTO-ENTMCNC: 32.4 G/DL (ref 32–36)
MCV RBC AUTO: 95 FL (ref 82–98)
MONOCYTES # BLD AUTO: 0.6 K/UL (ref 0.3–1)
MONOCYTES NFR BLD: 9 % (ref 4–15)
NEUTROPHILS # BLD AUTO: 4.3 K/UL (ref 1.8–7.7)
NEUTROPHILS NFR BLD: 60.4 % (ref 38–73)
NONHDLC SERPL-MCNC: 123 MG/DL
NRBC BLD-RTO: 0 /100 WBC
PLATELET # BLD AUTO: 322 K/UL (ref 150–450)
PMV BLD AUTO: 11.4 FL (ref 9.2–12.9)
POTASSIUM SERPL-SCNC: 4.5 MMOL/L (ref 3.5–5.1)
PROT SERPL-MCNC: 7.1 G/DL (ref 6–8.4)
RBC # BLD AUTO: 4.72 M/UL (ref 4–5.4)
SODIUM SERPL-SCNC: 140 MMOL/L (ref 136–145)
T4 FREE SERPL-MCNC: 1.06 NG/DL (ref 0.71–1.51)
TRIGL SERPL-MCNC: 130 MG/DL (ref 30–150)
TSH SERPL DL<=0.005 MIU/L-ACNC: 0.65 UIU/ML (ref 0.4–4)
WBC # BLD AUTO: 7.14 K/UL (ref 3.9–12.7)

## 2021-04-13 PROCEDURE — 85025 COMPLETE CBC W/AUTO DIFF WBC: CPT | Performed by: INTERNAL MEDICINE

## 2021-04-13 PROCEDURE — 80053 COMPREHEN METABOLIC PANEL: CPT | Performed by: INTERNAL MEDICINE

## 2021-04-13 PROCEDURE — 84439 ASSAY OF FREE THYROXINE: CPT | Performed by: INTERNAL MEDICINE

## 2021-04-13 PROCEDURE — 84443 ASSAY THYROID STIM HORMONE: CPT | Performed by: INTERNAL MEDICINE

## 2021-04-13 PROCEDURE — 36415 COLL VENOUS BLD VENIPUNCTURE: CPT | Mod: PO | Performed by: INTERNAL MEDICINE

## 2021-04-13 PROCEDURE — 80061 LIPID PANEL: CPT | Performed by: INTERNAL MEDICINE

## 2021-04-20 ENCOUNTER — OFFICE VISIT (OUTPATIENT)
Dept: INTERNAL MEDICINE | Facility: CLINIC | Age: 76
End: 2021-04-20
Payer: MEDICARE

## 2021-04-20 VITALS
RESPIRATION RATE: 16 BRPM | DIASTOLIC BLOOD PRESSURE: 84 MMHG | TEMPERATURE: 97 F | SYSTOLIC BLOOD PRESSURE: 174 MMHG | BODY MASS INDEX: 28.79 KG/M2 | WEIGHT: 146.63 LBS | HEIGHT: 60 IN | OXYGEN SATURATION: 97 % | HEART RATE: 76 BPM

## 2021-04-20 DIAGNOSIS — Z12.31 BREAST CANCER SCREENING BY MAMMOGRAM: ICD-10-CM

## 2021-04-20 DIAGNOSIS — G47.9 SLEEP DISTURBANCE: ICD-10-CM

## 2021-04-20 DIAGNOSIS — Z13.820 SCREENING FOR OSTEOPOROSIS: ICD-10-CM

## 2021-04-20 DIAGNOSIS — R03.0 BLOOD PRESSURE ELEVATED WITHOUT HISTORY OF HTN: ICD-10-CM

## 2021-04-20 DIAGNOSIS — Z78.0 POSTMENOPAUSAL: ICD-10-CM

## 2021-04-20 DIAGNOSIS — J30.9 ALLERGIC RHINITIS, UNSPECIFIED SEASONALITY, UNSPECIFIED TRIGGER: Primary | ICD-10-CM

## 2021-04-20 PROCEDURE — 99999 PR PBB SHADOW E&M-EST. PATIENT-LVL IV: CPT | Mod: PBBFAC,,, | Performed by: INTERNAL MEDICINE

## 2021-04-20 PROCEDURE — 99214 PR OFFICE/OUTPT VISIT, EST, LEVL IV, 30-39 MIN: ICD-10-PCS | Mod: S$PBB,,, | Performed by: INTERNAL MEDICINE

## 2021-04-20 PROCEDURE — 99999 PR PBB SHADOW E&M-EST. PATIENT-LVL IV: ICD-10-PCS | Mod: PBBFAC,,, | Performed by: INTERNAL MEDICINE

## 2021-04-20 PROCEDURE — 99214 OFFICE O/P EST MOD 30 MIN: CPT | Mod: PBBFAC,PO | Performed by: INTERNAL MEDICINE

## 2021-04-20 PROCEDURE — 99214 OFFICE O/P EST MOD 30 MIN: CPT | Mod: S$PBB,,, | Performed by: INTERNAL MEDICINE

## 2021-04-20 RX ORDER — PANTOPRAZOLE SODIUM 40 MG/1
TABLET, DELAYED RELEASE ORAL
Qty: 90 TABLET | Refills: 2 | Status: SHIPPED | OUTPATIENT
Start: 2021-04-20 | End: 2022-04-14

## 2021-05-02 ENCOUNTER — PATIENT MESSAGE (OUTPATIENT)
Dept: ADMINISTRATIVE | Facility: HOSPITAL | Age: 76
End: 2021-05-02

## 2021-05-04 ENCOUNTER — CLINICAL SUPPORT (OUTPATIENT)
Dept: INTERNAL MEDICINE | Facility: CLINIC | Age: 76
End: 2021-05-04
Payer: MEDICARE

## 2021-05-04 ENCOUNTER — TELEPHONE (OUTPATIENT)
Dept: INTERNAL MEDICINE | Facility: CLINIC | Age: 76
End: 2021-05-04

## 2021-05-04 VITALS — SYSTOLIC BLOOD PRESSURE: 148 MMHG | DIASTOLIC BLOOD PRESSURE: 78 MMHG

## 2021-05-18 ENCOUNTER — OFFICE VISIT (OUTPATIENT)
Dept: INTERNAL MEDICINE | Facility: CLINIC | Age: 76
End: 2021-05-18
Payer: MEDICARE

## 2021-05-18 VITALS
HEIGHT: 60 IN | WEIGHT: 142.63 LBS | BODY MASS INDEX: 28 KG/M2 | HEART RATE: 76 BPM | DIASTOLIC BLOOD PRESSURE: 64 MMHG | OXYGEN SATURATION: 98 % | TEMPERATURE: 98 F | SYSTOLIC BLOOD PRESSURE: 122 MMHG | RESPIRATION RATE: 12 BRPM

## 2021-05-18 DIAGNOSIS — Z01.818 PREOP EXAM FOR INTERNAL MEDICINE: Primary | ICD-10-CM

## 2021-05-18 DIAGNOSIS — J30.9 ALLERGIC RHINITIS, UNSPECIFIED SEASONALITY, UNSPECIFIED TRIGGER: ICD-10-CM

## 2021-05-18 DIAGNOSIS — H26.9 CATARACT OF BOTH EYES, UNSPECIFIED CATARACT TYPE: ICD-10-CM

## 2021-05-18 PROCEDURE — 93010 EKG 12-LEAD: ICD-10-PCS | Mod: S$PBB,,, | Performed by: INTERNAL MEDICINE

## 2021-05-18 PROCEDURE — 93005 ELECTROCARDIOGRAM TRACING: CPT | Mod: PBBFAC,PO | Performed by: INTERNAL MEDICINE

## 2021-05-18 PROCEDURE — 99213 OFFICE O/P EST LOW 20 MIN: CPT | Mod: PBBFAC,25,PO | Performed by: INTERNAL MEDICINE

## 2021-05-18 PROCEDURE — 99999 PR PBB SHADOW E&M-EST. PATIENT-LVL III: CPT | Mod: PBBFAC,,, | Performed by: INTERNAL MEDICINE

## 2021-05-18 PROCEDURE — 99214 PR OFFICE/OUTPT VISIT, EST, LEVL IV, 30-39 MIN: ICD-10-PCS | Mod: S$PBB,,, | Performed by: INTERNAL MEDICINE

## 2021-05-18 PROCEDURE — 99999 PR PBB SHADOW E&M-EST. PATIENT-LVL III: ICD-10-PCS | Mod: PBBFAC,,, | Performed by: INTERNAL MEDICINE

## 2021-05-18 PROCEDURE — 99214 OFFICE O/P EST MOD 30 MIN: CPT | Mod: S$PBB,,, | Performed by: INTERNAL MEDICINE

## 2021-05-18 PROCEDURE — 93010 ELECTROCARDIOGRAM REPORT: CPT | Mod: S$PBB,,, | Performed by: INTERNAL MEDICINE

## 2021-06-02 DIAGNOSIS — Z12.11 COLON CANCER SCREENING: ICD-10-CM

## 2021-06-29 ENCOUNTER — TELEPHONE (OUTPATIENT)
Dept: INTERNAL MEDICINE | Facility: CLINIC | Age: 76
End: 2021-06-29

## 2021-07-06 ENCOUNTER — PATIENT MESSAGE (OUTPATIENT)
Dept: ADMINISTRATIVE | Facility: HOSPITAL | Age: 76
End: 2021-07-06

## 2021-07-13 ENCOUNTER — TELEPHONE (OUTPATIENT)
Dept: INTERNAL MEDICINE | Facility: CLINIC | Age: 76
End: 2021-07-13

## 2021-08-09 ENCOUNTER — TELEPHONE (OUTPATIENT)
Dept: INTERNAL MEDICINE | Facility: CLINIC | Age: 76
End: 2021-08-09

## 2021-10-04 ENCOUNTER — PATIENT MESSAGE (OUTPATIENT)
Dept: ADMINISTRATIVE | Facility: HOSPITAL | Age: 76
End: 2021-10-04

## 2021-12-03 ENCOUNTER — IMMUNIZATION (OUTPATIENT)
Dept: INTERNAL MEDICINE | Facility: CLINIC | Age: 76
End: 2021-12-03
Payer: MEDICARE

## 2021-12-03 DIAGNOSIS — Z23 NEED FOR VACCINATION: Primary | ICD-10-CM

## 2021-12-03 PROCEDURE — 0064A COVID-19, MRNA, LNP-S, PF, 100 MCG/0.25 ML DOSE VACCINE (MODERNA BOOSTER): CPT | Mod: PBBFAC,CV19

## 2021-12-17 ENCOUNTER — TELEPHONE (OUTPATIENT)
Dept: INTERNAL MEDICINE | Facility: CLINIC | Age: 76
End: 2021-12-17
Payer: COMMERCIAL

## 2021-12-17 DIAGNOSIS — Z00.00 ANNUAL PHYSICAL EXAM: Primary | ICD-10-CM

## 2021-12-17 DIAGNOSIS — R03.0 BLOOD PRESSURE ELEVATED WITHOUT HISTORY OF HTN: ICD-10-CM

## 2022-01-25 ENCOUNTER — PATIENT MESSAGE (OUTPATIENT)
Dept: ADMINISTRATIVE | Facility: HOSPITAL | Age: 77
End: 2022-01-25
Payer: COMMERCIAL

## 2022-03-21 ENCOUNTER — LAB VISIT (OUTPATIENT)
Dept: LAB | Facility: HOSPITAL | Age: 77
End: 2022-03-21
Attending: INTERNAL MEDICINE
Payer: MEDICARE

## 2022-03-21 DIAGNOSIS — R03.0 BLOOD PRESSURE ELEVATED WITHOUT HISTORY OF HTN: ICD-10-CM

## 2022-03-21 DIAGNOSIS — Z00.00 ANNUAL PHYSICAL EXAM: ICD-10-CM

## 2022-03-21 LAB
ALBUMIN SERPL BCP-MCNC: 3.8 G/DL (ref 3.5–5.2)
ALP SERPL-CCNC: 92 U/L (ref 55–135)
ALT SERPL W/O P-5'-P-CCNC: 14 U/L (ref 10–44)
ANION GAP SERPL CALC-SCNC: 11 MMOL/L (ref 8–16)
AST SERPL-CCNC: 21 U/L (ref 10–40)
BASOPHILS # BLD AUTO: 0.08 K/UL (ref 0–0.2)
BASOPHILS NFR BLD: 1.3 % (ref 0–1.9)
BILIRUB SERPL-MCNC: 0.9 MG/DL (ref 0.1–1)
BUN SERPL-MCNC: 12 MG/DL (ref 8–23)
CALCIUM SERPL-MCNC: 10 MG/DL (ref 8.7–10.5)
CHLORIDE SERPL-SCNC: 102 MMOL/L (ref 95–110)
CHOLEST SERPL-MCNC: 192 MG/DL (ref 120–199)
CHOLEST/HDLC SERPL: 2.8 {RATIO} (ref 2–5)
CO2 SERPL-SCNC: 26 MMOL/L (ref 23–29)
CREAT SERPL-MCNC: 0.8 MG/DL (ref 0.5–1.4)
DIFFERENTIAL METHOD: ABNORMAL
EOSINOPHIL # BLD AUTO: 0.2 K/UL (ref 0–0.5)
EOSINOPHIL NFR BLD: 3.5 % (ref 0–8)
ERYTHROCYTE [DISTWIDTH] IN BLOOD BY AUTOMATED COUNT: 12.8 % (ref 11.5–14.5)
EST. GFR  (AFRICAN AMERICAN): >60 ML/MIN/1.73 M^2
EST. GFR  (NON AFRICAN AMERICAN): >60 ML/MIN/1.73 M^2
ESTIMATED AVG GLUCOSE: 111 MG/DL (ref 68–131)
GLUCOSE SERPL-MCNC: 84 MG/DL (ref 70–110)
HBA1C MFR BLD: 5.5 % (ref 4–5.6)
HCT VFR BLD AUTO: 46.3 % (ref 37–48.5)
HDLC SERPL-MCNC: 68 MG/DL (ref 40–75)
HDLC SERPL: 35.4 % (ref 20–50)
HGB BLD-MCNC: 15.2 G/DL (ref 12–16)
IMM GRANULOCYTES # BLD AUTO: 0.05 K/UL (ref 0–0.04)
IMM GRANULOCYTES NFR BLD AUTO: 0.8 % (ref 0–0.5)
LDLC SERPL CALC-MCNC: 100 MG/DL (ref 63–159)
LYMPHOCYTES # BLD AUTO: 1.6 K/UL (ref 1–4.8)
LYMPHOCYTES NFR BLD: 26.2 % (ref 18–48)
MCH RBC QN AUTO: 31.2 PG (ref 27–31)
MCHC RBC AUTO-ENTMCNC: 32.8 G/DL (ref 32–36)
MCV RBC AUTO: 95 FL (ref 82–98)
MONOCYTES # BLD AUTO: 0.6 K/UL (ref 0.3–1)
MONOCYTES NFR BLD: 9.2 % (ref 4–15)
NEUTROPHILS # BLD AUTO: 3.5 K/UL (ref 1.8–7.7)
NEUTROPHILS NFR BLD: 59 % (ref 38–73)
NONHDLC SERPL-MCNC: 124 MG/DL
NRBC BLD-RTO: 0 /100 WBC
PLATELET # BLD AUTO: 326 K/UL (ref 150–450)
PMV BLD AUTO: 10.7 FL (ref 9.2–12.9)
POTASSIUM SERPL-SCNC: 4 MMOL/L (ref 3.5–5.1)
PROT SERPL-MCNC: 7.2 G/DL (ref 6–8.4)
RBC # BLD AUTO: 4.87 M/UL (ref 4–5.4)
SODIUM SERPL-SCNC: 139 MMOL/L (ref 136–145)
T4 FREE SERPL-MCNC: 1.01 NG/DL (ref 0.71–1.51)
TRIGL SERPL-MCNC: 120 MG/DL (ref 30–150)
TSH SERPL DL<=0.005 MIU/L-ACNC: 0.96 UIU/ML (ref 0.4–4)
WBC # BLD AUTO: 5.99 K/UL (ref 3.9–12.7)

## 2022-03-21 PROCEDURE — 86803 HEPATITIS C AB TEST: CPT | Performed by: INTERNAL MEDICINE

## 2022-03-21 PROCEDURE — 36415 COLL VENOUS BLD VENIPUNCTURE: CPT | Mod: PO | Performed by: INTERNAL MEDICINE

## 2022-03-21 PROCEDURE — 84443 ASSAY THYROID STIM HORMONE: CPT | Performed by: INTERNAL MEDICINE

## 2022-03-21 PROCEDURE — 85025 COMPLETE CBC W/AUTO DIFF WBC: CPT | Performed by: INTERNAL MEDICINE

## 2022-03-21 PROCEDURE — 80053 COMPREHEN METABOLIC PANEL: CPT | Performed by: INTERNAL MEDICINE

## 2022-03-21 PROCEDURE — 80061 LIPID PANEL: CPT | Performed by: INTERNAL MEDICINE

## 2022-03-21 PROCEDURE — 84439 ASSAY OF FREE THYROXINE: CPT | Performed by: INTERNAL MEDICINE

## 2022-03-21 PROCEDURE — 83036 HEMOGLOBIN GLYCOSYLATED A1C: CPT | Performed by: INTERNAL MEDICINE

## 2022-03-22 LAB — HCV AB SERPL QL IA: NEGATIVE

## 2022-03-28 ENCOUNTER — OFFICE VISIT (OUTPATIENT)
Dept: INTERNAL MEDICINE | Facility: CLINIC | Age: 77
End: 2022-03-28
Payer: MEDICARE

## 2022-03-28 VITALS
HEART RATE: 64 BPM | BODY MASS INDEX: 27.06 KG/M2 | SYSTOLIC BLOOD PRESSURE: 160 MMHG | DIASTOLIC BLOOD PRESSURE: 80 MMHG | HEIGHT: 61 IN | WEIGHT: 143.31 LBS | TEMPERATURE: 98 F | RESPIRATION RATE: 16 BRPM

## 2022-03-28 DIAGNOSIS — J30.9 ALLERGIC RHINITIS, UNSPECIFIED SEASONALITY, UNSPECIFIED TRIGGER: ICD-10-CM

## 2022-03-28 DIAGNOSIS — R03.0 BLOOD PRESSURE ELEVATED WITHOUT HISTORY OF HTN: Primary | ICD-10-CM

## 2022-03-28 DIAGNOSIS — Z12.11 COLON CANCER SCREENING: ICD-10-CM

## 2022-03-28 PROCEDURE — 99999 PR PBB SHADOW E&M-EST. PATIENT-LVL IV: ICD-10-PCS | Mod: PBBFAC,,, | Performed by: INTERNAL MEDICINE

## 2022-03-28 PROCEDURE — 99214 OFFICE O/P EST MOD 30 MIN: CPT | Mod: S$PBB,,, | Performed by: INTERNAL MEDICINE

## 2022-03-28 PROCEDURE — 99999 PR PBB SHADOW E&M-EST. PATIENT-LVL IV: CPT | Mod: PBBFAC,,, | Performed by: INTERNAL MEDICINE

## 2022-03-28 PROCEDURE — 99214 PR OFFICE/OUTPT VISIT, EST, LEVL IV, 30-39 MIN: ICD-10-PCS | Mod: S$PBB,,, | Performed by: INTERNAL MEDICINE

## 2022-03-28 PROCEDURE — 99214 OFFICE O/P EST MOD 30 MIN: CPT | Mod: PBBFAC,PO | Performed by: INTERNAL MEDICINE

## 2022-04-10 LAB — NONINV COLON CA DNA+OCC BLD SCRN STL QL: NEGATIVE

## 2022-04-11 NOTE — PROGRESS NOTES
The Cologuard colon cancer screening test was negative.  Please notify the patient.  A repeat screening test in 3 years is recommended.

## 2022-04-11 NOTE — PROGRESS NOTES
Subjective:       Patient ID: Laurie Hicks is a 76 y.o. female.    Chief Complaint: Annual Exam (With labs to revu.)    HPI   The patient presents for annual examination and follow-up of medical conditions which include chronic rhinitis.  The patient does not have a history of hypertension, diabetes mellitus, asthma, or heart disease.  She has been noting a cough from postnasal drainage.  She has not experienced any chest pain, chest congestion, or shortness of breath.  Her exercise tolerance is unchanged.  She is status post bilateral cataract surgeries with good vision results noted following the procedures.    Immunization record was reviewed.    Screening tests were reviewed.  The patient wishes to use the Cologuard screening test for colon cancer screening.  The patient the is also due for bone density study; however, she declines taking the bone density test.    No interval change in past medical history, family history, or social history since prior evaluations.    Review of Systems   Constitutional: Negative for fatigue, fever and unexpected weight change.   HENT: Positive for postnasal drip. Negative for nasal congestion, rhinorrhea and sore throat.    Eyes: Negative for visual disturbance.   Respiratory: Negative for cough, chest tightness, shortness of breath and wheezing.    Cardiovascular: Negative for chest pain, palpitations and leg swelling.   Gastrointestinal: Negative for abdominal pain and blood in stool.   Genitourinary: Negative for dysuria, frequency and hematuria.   Musculoskeletal: Negative for arthralgias, back pain, joint swelling and myalgias.   Integumentary:  Negative for rash.   Neurological: Negative for dizziness, syncope, weakness, numbness and headaches.   Psychiatric/Behavioral: Negative for sleep disturbance. The patient is not nervous/anxious.             Physical Exam  Vitals and nursing note reviewed.   Constitutional:       General: She is not in acute distress.      Appearance: She is well-developed.      Comments: The patient's weight has remained stable since 05/18/2021.   HENT:      Head: Normocephalic and atraumatic.      Right Ear: External ear normal.      Left Ear: External ear normal.      Nose: Nose normal.      Mouth/Throat:      Pharynx: No oropharyngeal exudate.   Eyes:      General: No scleral icterus.     Conjunctiva/sclera: Conjunctivae normal.      Pupils: Pupils are equal, round, and reactive to light.   Neck:      Thyroid: No thyromegaly.      Vascular: No carotid bruit or JVD.   Cardiovascular:      Rate and Rhythm: Normal rate and regular rhythm.      Pulses: Normal pulses.      Heart sounds: Normal heart sounds. No murmur heard.    No friction rub. No gallop.   Pulmonary:      Effort: Pulmonary effort is normal. No respiratory distress.      Breath sounds: Normal breath sounds. No wheezing or rales.   Chest:   Breasts:      Right: No supraclavicular adenopathy.      Left: No supraclavicular adenopathy.       Abdominal:      General: Bowel sounds are normal. There is no abdominal bruit.      Palpations: Abdomen is soft. There is no hepatomegaly, splenomegaly or mass.      Tenderness: There is no abdominal tenderness.      Hernia: No hernia is present.   Musculoskeletal:         General: No tenderness. Normal range of motion.      Right shoulder: No deformity or effusion.      Cervical back: Normal range of motion and neck supple.   Lymphadenopathy:      Cervical: No cervical adenopathy.      Upper Body:      Right upper body: No supraclavicular adenopathy.      Left upper body: No supraclavicular adenopathy.   Skin:     General: Skin is warm and dry.      Findings: No rash.   Neurological:      Mental Status: She is alert and oriented to person, place, and time.      Cranial Nerves: No cranial nerve deficit.   Psychiatric:         Speech: Speech normal.         Behavior: Behavior normal.           Lab Visit on 03/21/2022   Component Date Value Ref Range  Status    Specimen UA 03/21/2022 Urine, Unspecified   Final    Color, UA 03/21/2022 Yellow  Yellow, Straw, Ludmila Final    Appearance, UA 03/21/2022 Hazy (A) Clear Final    pH, UA 03/21/2022 5.0  5.0 - 8.0 Final    Specific Gravity, UA 03/21/2022 1.020  1.005 - 1.030 Final    Protein, UA 03/21/2022 Negative  Negative Final    Comment: Recommend a 24 hour urine protein or a urine   protein/creatinine ratio if globulin induced proteinuria is  clinically suspected.      Glucose, UA 03/21/2022 Negative  Negative Final    Ketones, UA 03/21/2022 Negative  Negative Final    Bilirubin (UA) 03/21/2022 Negative  Negative Final    Occult Blood UA 03/21/2022 1+ (A) Negative Final    Nitrite, UA 03/21/2022 Negative  Negative Final    Leukocytes, UA 03/21/2022 3+ (A) Negative Final    RBC, UA 03/21/2022 3  0 - 4 /hpf Final    WBC, UA 03/21/2022 22 (A) 0 - 5 /hpf Final    Bacteria 03/21/2022 Few (A) None-Occ /hpf Final    Squam Epithel, UA 03/21/2022 20  /hpf Final    Non-Squam Epith 03/21/2022 1 (A) <1/hpf /hpf Final    Microscopic Comment 03/21/2022 SEE COMMENT   Final    Comment: Other formed elements not mentioned in the report are not   present in the microscopic examination.      Lab Visit on 03/21/2022   Component Date Value Ref Range Status    Sodium 03/21/2022 139  136 - 145 mmol/L Final    Potassium 03/21/2022 4.0  3.5 - 5.1 mmol/L Final    Chloride 03/21/2022 102  95 - 110 mmol/L Final    CO2 03/21/2022 26  23 - 29 mmol/L Final    Glucose 03/21/2022 84  70 - 110 mg/dL Final    BUN 03/21/2022 12  8 - 23 mg/dL Final    Creatinine 03/21/2022 0.8  0.5 - 1.4 mg/dL Final    Calcium 03/21/2022 10.0  8.7 - 10.5 mg/dL Final    Total Protein 03/21/2022 7.2  6.0 - 8.4 g/dL Final    Albumin 03/21/2022 3.8  3.5 - 5.2 g/dL Final    Total Bilirubin 03/21/2022 0.9  0.1 - 1.0 mg/dL Final    Comment: For infants and newborns, interpretation of results should be based  on gestational age, weight and in  agreement with clinical  observations.    Premature Infant recommended reference ranges:  Up to 24 hours.............<8.0 mg/dL  Up to 48 hours............<12.0 mg/dL  3-5 days..................<15.0 mg/dL  6-29 days.................<15.0 mg/dL      Alkaline Phosphatase 03/21/2022 92  55 - 135 U/L Final    AST 03/21/2022 21  10 - 40 U/L Final    ALT 03/21/2022 14  10 - 44 U/L Final    Anion Gap 03/21/2022 11  8 - 16 mmol/L Final    eGFR if African American 03/21/2022 >60.0  >60 mL/min/1.73 m^2 Final    eGFR if non African American 03/21/2022 >60.0  >60 mL/min/1.73 m^2 Final    Comment: Calculation used to obtain the estimated glomerular filtration  rate (eGFR) is the CKD-EPI equation.       Cholesterol 03/21/2022 192  120 - 199 mg/dL Final    Comment: The National Cholesterol Education Program (NCEP) has set the  following guidelines (reference ranges) for Cholesterol:  Optimal.....................<200 mg/dL  Borderline High.............200-239 mg/dL  High........................> or = 240 mg/dL      Triglycerides 03/21/2022 120  30 - 150 mg/dL Final    Comment: The National Cholesterol Education Program (NCEP) has set the  following guidelines (reference values) for triglycerides:  Normal......................<150 mg/dL  Borderline High.............150-199 mg/dL  High........................200-499 mg/dL      HDL 03/21/2022 68  40 - 75 mg/dL Final    Comment: The National Cholesterol Education Program (NCEP) has set the  following guidelines (reference values) for HDL Cholesterol:  Low...............<40 mg/dL  Optimal...........>60 mg/dL      LDL Cholesterol 03/21/2022 100.0  63.0 - 159.0 mg/dL Final    Comment: The National Cholesterol Education Program (NCEP) has set the  following guidelines (reference values) for LDL Cholesterol:  Optimal.......................<130 mg/dL  Borderline High...............130-159 mg/dL  High..........................160-189 mg/dL  Very High.....................>190  mg/dL      HDL/Cholesterol Ratio 03/21/2022 35.4  20.0 - 50.0 % Final    Total Cholesterol/HDL Ratio 03/21/2022 2.8  2.0 - 5.0 Final    Non-HDL Cholesterol 03/21/2022 124  mg/dL Final    Comment: Risk category and Non-HDL cholesterol goals:  Coronary heart disease (CHD)or equivalent (10-year risk of CHD >20%):  Non-HDL cholesterol goal     <130 mg/dL  Two or more CHD risk factors and 10-year risk of CHD <= 20%:  Non-HDL cholesterol goal     <160 mg/dL  0 to 1 CHD risk factor:  Non-HDL cholesterol goal     <190 mg/dL      TSH 03/21/2022 0.958  0.400 - 4.000 uIU/mL Final    WBC 03/21/2022 5.99  3.90 - 12.70 K/uL Final    RBC 03/21/2022 4.87  4.00 - 5.40 M/uL Final    Hemoglobin 03/21/2022 15.2  12.0 - 16.0 g/dL Final    Hematocrit 03/21/2022 46.3  37.0 - 48.5 % Final    MCV 03/21/2022 95  82 - 98 fL Final    MCH 03/21/2022 31.2 (A) 27.0 - 31.0 pg Final    MCHC 03/21/2022 32.8  32.0 - 36.0 g/dL Final    RDW 03/21/2022 12.8  11.5 - 14.5 % Final    Platelets 03/21/2022 326  150 - 450 K/uL Final    MPV 03/21/2022 10.7  9.2 - 12.9 fL Final    Immature Granulocytes 03/21/2022 0.8 (A) 0.0 - 0.5 % Final    Gran # (ANC) 03/21/2022 3.5  1.8 - 7.7 K/uL Final    Immature Grans (Abs) 03/21/2022 0.05 (A) 0.00 - 0.04 K/uL Final    Comment: Mild elevation in immature granulocytes is non specific and   can be seen in a variety of conditions including stress response,   acute inflammation, trauma and pregnancy. Correlation with other   laboratory and clinical findings is essential.      Lymph # 03/21/2022 1.6  1.0 - 4.8 K/uL Final    Mono # 03/21/2022 0.6  0.3 - 1.0 K/uL Final    Eos # 03/21/2022 0.2  0.0 - 0.5 K/uL Final    Baso # 03/21/2022 0.08  0.00 - 0.20 K/uL Final    nRBC 03/21/2022 0  0 /100 WBC Final    Gran % 03/21/2022 59.0  38.0 - 73.0 % Final    Lymph % 03/21/2022 26.2  18.0 - 48.0 % Final    Mono % 03/21/2022 9.2  4.0 - 15.0 % Final    Eosinophil % 03/21/2022 3.5  0.0 - 8.0 % Final    Basophil %  03/21/2022 1.3  0.0 - 1.9 % Final    Differential Method 03/21/2022 Automated   Final    Free T4 03/21/2022 1.01  0.71 - 1.51 ng/dL Final    Hemoglobin A1C 03/21/2022 5.5  4.0 - 5.6 % Final    Comment: ADA Screening Guidelines:  5.7-6.4%  Consistent with prediabetes  >or=6.5%  Consistent with diabetes    High levels of fetal hemoglobin interfere with the HbA1C  assay. Heterozygous hemoglobin variants (HbS, HgC, etc)do  not significantly interfere with this assay.   However, presence of multiple variants may affect accuracy.      Estimated Avg Glucose 03/21/2022 111  68 - 131 mg/dL Final    Hepatitis C Ab 03/21/2022 Negative  Negative Final       Assessment & Plan:      Laurie was seen today for annual exam and follow-up.  Cologuard will be ordered for colon cancer screening.  The patient declines taking a bone density study for osteoporosis screening.    A blood pressure recheck with the staff in 2- 3 weeks is recommended to assess for hypertension.  Home blood pressure monitoring was also recommended.    Diagnoses and all orders for this visit:    Blood pressure elevated without history of HTN    Allergic rhinitis, unspecified seasonality, unspecified trigger    Colon cancer screening  -     Cologuard Screening (Multitarget Stool DNA); Future  -     Cologuard Screening (Multitarget Stool DNA)         No follow-ups on file.     Antonino Mesa MD

## 2022-04-14 RX ORDER — PANTOPRAZOLE SODIUM 40 MG/1
TABLET, DELAYED RELEASE ORAL
Qty: 90 TABLET | Refills: 2 | Status: SHIPPED | OUTPATIENT
Start: 2022-04-14 | End: 2023-01-09

## 2022-04-14 NOTE — TELEPHONE ENCOUNTER
No new care gaps identified.  Powered by [employer]. Reference number: [id number].   4/14/2022 5:23:40 AM CDT

## 2022-04-14 NOTE — TELEPHONE ENCOUNTER
Refill Routing Note   Medication(s) are not appropriate for processing by [facility] for the following reason(s):      - Required indication for medication not on problem list (gerd)    [facility] action(s):  Route          Medication reconciliation completed: No     Appointments  past 12m or future 3m with PCP    Date Provider   Last Visit   3/28/2022 Antonino Mesa MD   Next Visit   Visit date not found Antonino Mesa MD   ED visits in past 90 days: 0        Note composed:10:34 AM 04/14/2022

## 2022-04-18 ENCOUNTER — TELEPHONE (OUTPATIENT)
Dept: INTERNAL MEDICINE | Facility: CLINIC | Age: 77
End: 2022-04-18
Payer: COMMERCIAL

## 2022-04-18 RX ORDER — AMLODIPINE BESYLATE 2.5 MG/1
2.5 TABLET ORAL DAILY
Qty: 30 TABLET | Refills: 6 | Status: SHIPPED | OUTPATIENT
Start: 2022-04-18 | End: 2022-10-24 | Stop reason: SDUPTHER

## 2022-04-18 NOTE — TELEPHONE ENCOUNTER
A prescription order for amlodipine 2.5 mg daily for  blood pressure control was sent to the pharmacy.

## 2022-04-18 NOTE — TELEPHONE ENCOUNTER
She had nurse visit today for bp ck.    Her cuff at home highest number was 145/ 80 or lower at home.    At office today. Her cuff Left arm  179/102  66.     I heard 164/78  p68                          Her cuff right arm 172/90 p63       I h eard 154/78 p66    Her 2 younger sisters have been on blood pressure medication for some time.  Her mother also was on blood pressure medication.     She will get a new blood pressure cuff and we made another nurse visit for 5/16 to yonis.  I told her I would call her once you reviewed and if rx sent to pharmacy.  Told her if dr orders medication to take it in am, w/n 1 hour of same time every day.  Also ck bp 2 hours or more later in day and bring readings in for nurse visit in a month.    [facility] is correct pharmacy.'  Please advise and I will let her know what was ordered.    After nurse visit in may If pressure is good I will make a 6 mos fol up visit to see you , sooner if bp not better.       Thanks yinka

## 2022-04-18 NOTE — TELEPHONE ENCOUNTER
I called and told her what ordered and reminded her of visit made.  Call us if any questions/concerns.

## 2022-05-16 ENCOUNTER — CLINICAL SUPPORT (OUTPATIENT)
Dept: INTERNAL MEDICINE | Facility: CLINIC | Age: 77
End: 2022-05-16
Payer: MEDICARE

## 2022-05-16 ENCOUNTER — TELEPHONE (OUTPATIENT)
Dept: INTERNAL MEDICINE | Facility: CLINIC | Age: 77
End: 2022-05-16

## 2022-05-16 VITALS — DIASTOLIC BLOOD PRESSURE: 72 MMHG | SYSTOLIC BLOOD PRESSURE: 130 MMHG | HEART RATE: 68 BPM

## 2022-05-16 DIAGNOSIS — R03.0 BLOOD PRESSURE ELEVATED WITHOUT HISTORY OF HTN: Primary | ICD-10-CM

## 2022-05-16 PROCEDURE — 99211 OFF/OP EST MAY X REQ PHY/QHP: CPT | Mod: PBBFAC,PO

## 2022-05-16 PROCEDURE — 99999 PR PBB SHADOW E&M-EST. PATIENT-LVL I: ICD-10-PCS | Mod: PBBFAC,,,

## 2022-05-16 PROCEDURE — 99999 PR PBB SHADOW E&M-EST. PATIENT-LVL I: CPT | Mod: PBBFAC,,,

## 2022-05-16 NOTE — PROGRESS NOTES
Brought list of home readings. Putting in telecall to dr hickman to review./      Walking every am with neighbor and she checks her bp before walk.    Readings 145/80= 121/66.  No bp readings in 140's since may 3rd.

## 2022-05-16 NOTE — TELEPHONE ENCOUNTER
Seen today for nurse visit to follow up since starting amlodpine 2.5 on 4/15/22.    Starting walking every early am with neighbor and she checks their bp before they walk.    Today in office bp 130/72.  Doing well on medication. Has recall to see you march 2023 for annual.     Readings from home are below:    4/18 145/80  (started amlodpine 4/15/22)  4/19  142/79  4/20 141/80  4/21 117/79  4/22  143/22 4/23 125/84  4/24  137/81  4/25 140/77  4/26  137/80  4/27  140/78  4/28 142/81  4/29  140/97  4/30 130/79  5/1 135/83  5/2 130/78  5/3  140/76  5/4  134/80  5/5 131/73  5/6 120/77  5/7 133/67  5/8 134/73  5/9 124/79  5/10 133/70  5/11 120/77  5/12  121/66  5/13 125/84

## 2022-08-31 DIAGNOSIS — Z78.0 MENOPAUSE: ICD-10-CM

## 2022-09-14 DIAGNOSIS — Z78.0 MENOPAUSE: ICD-10-CM

## 2022-10-24 NOTE — TELEPHONE ENCOUNTER
----- Message from Kayla Eng sent at 10/24/2022  8:44 AM CDT -----  Contact: pt [phone]  Requesting an RX refill or new RX.  Is this a refill or new RX: refill  RX name and strength (copy/paste from chart):  amLODIPine (NORVASC) 2.5 MG tablet  Is this a 30 day or 90 day RX: 90  Pharmacy name and phone # (copy/paste from chart):    [id number] - [place] [address] [facility] [address] [facility]  [address] [facility] [address]  Phone: [phone] Fax: [phone]      The doctors have asked that we provide their patients with the following 2 reminders -- prescription refills can take up to 72 hours, and a friendly reminder that in the future you can use your [facility] account to request refills: yes

## 2022-10-25 RX ORDER — AMLODIPINE BESYLATE 2.5 MG/1
2.5 TABLET ORAL DAILY
Qty: 90 TABLET | Refills: 1 | Status: SHIPPED | OUTPATIENT
Start: 2022-10-25 | End: 2023-04-18 | Stop reason: SDUPTHER

## 2022-10-25 NOTE — TELEPHONE ENCOUNTER
Refill Decision Note   Laurie Kurt  is requesting a refill authorization.  Brief Assessment and Rationale for Refill:  Approve     Medication Therapy Plan:       Medication Reconciliation Completed: No   Comments:     No Care Gaps recommended.     Note composed:5:02 AM 10/25/2022

## 2022-11-21 DIAGNOSIS — Z00.00 ANNUAL PHYSICAL EXAM: Primary | ICD-10-CM

## 2022-11-21 DIAGNOSIS — R03.0 BLOOD PRESSURE ELEVATED WITHOUT HISTORY OF HTN: ICD-10-CM

## 2023-02-08 RX ORDER — PANTOPRAZOLE SODIUM 40 MG/1
TABLET, DELAYED RELEASE ORAL
Qty: 90 TABLET | Refills: 0 | Status: SHIPPED | OUTPATIENT
Start: 2023-02-08 | End: 2023-05-09

## 2023-02-08 NOTE — TELEPHONE ENCOUNTER
Refill Decision Note   Laurie Hicks  is requesting a refill authorization.  Brief Assessment and Rationale for Refill:  Approve     Medication Therapy Plan:       Medication Reconciliation Completed: No   Comments:     No Care Gaps recommended.     Note composed:1:16 PM 02/08/2023

## 2023-02-08 NOTE — TELEPHONE ENCOUNTER
No new care gaps identified.  [facility] Embedded Care Gaps. Reference number: [id number]. 2/08/2023   8:09:15 AM CST

## 2023-04-11 ENCOUNTER — LAB VISIT (OUTPATIENT)
Dept: LAB | Facility: HOSPITAL | Age: 78
End: 2023-04-11
Attending: INTERNAL MEDICINE
Payer: MEDICARE

## 2023-04-11 DIAGNOSIS — Z00.00 ANNUAL PHYSICAL EXAM: ICD-10-CM

## 2023-04-11 DIAGNOSIS — R03.0 BLOOD PRESSURE ELEVATED WITHOUT HISTORY OF HTN: ICD-10-CM

## 2023-04-11 LAB
ALBUMIN SERPL BCP-MCNC: 3.8 G/DL (ref 3.5–5.2)
ALP SERPL-CCNC: 86 U/L (ref 55–135)
ALT SERPL W/O P-5'-P-CCNC: 11 U/L (ref 10–44)
ANION GAP SERPL CALC-SCNC: 9 MMOL/L (ref 8–16)
AST SERPL-CCNC: 19 U/L (ref 10–40)
BASOPHILS # BLD AUTO: 0.08 K/UL (ref 0–0.2)
BASOPHILS NFR BLD: 0.9 % (ref 0–1.9)
BILIRUB SERPL-MCNC: 0.9 MG/DL (ref 0.1–1)
BUN SERPL-MCNC: 13 MG/DL (ref 8–23)
CALCIUM SERPL-MCNC: 9.5 MG/DL (ref 8.7–10.5)
CHLORIDE SERPL-SCNC: 104 MMOL/L (ref 95–110)
CHOLEST SERPL-MCNC: 194 MG/DL (ref 120–199)
CHOLEST/HDLC SERPL: 3 {RATIO} (ref 2–5)
CO2 SERPL-SCNC: 26 MMOL/L (ref 23–29)
CREAT SERPL-MCNC: 0.7 MG/DL (ref 0.5–1.4)
DIFFERENTIAL METHOD: ABNORMAL
EOSINOPHIL # BLD AUTO: 0.3 K/UL (ref 0–0.5)
EOSINOPHIL NFR BLD: 3.6 % (ref 0–8)
ERYTHROCYTE [DISTWIDTH] IN BLOOD BY AUTOMATED COUNT: 13.2 % (ref 11.5–14.5)
EST. GFR  (NO RACE VARIABLE): >60 ML/MIN/1.73 M^2
ESTIMATED AVG GLUCOSE: 97 MG/DL (ref 68–131)
GLUCOSE SERPL-MCNC: 67 MG/DL (ref 70–110)
HBA1C MFR BLD: 5 % (ref 4–5.6)
HCT VFR BLD AUTO: 44.9 % (ref 37–48.5)
HDLC SERPL-MCNC: 65 MG/DL (ref 40–75)
HDLC SERPL: 33.5 % (ref 20–50)
HGB BLD-MCNC: 14.2 G/DL (ref 12–16)
IMM GRANULOCYTES # BLD AUTO: 0.04 K/UL (ref 0–0.04)
IMM GRANULOCYTES NFR BLD AUTO: 0.4 % (ref 0–0.5)
LDLC SERPL CALC-MCNC: 106.4 MG/DL (ref 63–159)
LYMPHOCYTES # BLD AUTO: 1.9 K/UL (ref 1–4.8)
LYMPHOCYTES NFR BLD: 20.2 % (ref 18–48)
MCH RBC QN AUTO: 30.6 PG (ref 27–31)
MCHC RBC AUTO-ENTMCNC: 31.6 G/DL (ref 32–36)
MCV RBC AUTO: 97 FL (ref 82–98)
MONOCYTES # BLD AUTO: 0.7 K/UL (ref 0.3–1)
MONOCYTES NFR BLD: 8.1 % (ref 4–15)
NEUTROPHILS # BLD AUTO: 6.1 K/UL (ref 1.8–7.7)
NEUTROPHILS NFR BLD: 66.8 % (ref 38–73)
NONHDLC SERPL-MCNC: 129 MG/DL
NRBC BLD-RTO: 0 /100 WBC
PLATELET # BLD AUTO: 306 K/UL (ref 150–450)
PMV BLD AUTO: 10.6 FL (ref 9.2–12.9)
POTASSIUM SERPL-SCNC: 3.9 MMOL/L (ref 3.5–5.1)
PROT SERPL-MCNC: 7 G/DL (ref 6–8.4)
RBC # BLD AUTO: 4.64 M/UL (ref 4–5.4)
SODIUM SERPL-SCNC: 139 MMOL/L (ref 136–145)
T4 FREE SERPL-MCNC: 1.07 NG/DL (ref 0.71–1.51)
TRIGL SERPL-MCNC: 113 MG/DL (ref 30–150)
TSH SERPL DL<=0.005 MIU/L-ACNC: 0.99 UIU/ML (ref 0.4–4)
WBC # BLD AUTO: 9.14 K/UL (ref 3.9–12.7)

## 2023-04-11 PROCEDURE — 84439 ASSAY OF FREE THYROXINE: CPT | Performed by: INTERNAL MEDICINE

## 2023-04-11 PROCEDURE — 80061 LIPID PANEL: CPT | Performed by: INTERNAL MEDICINE

## 2023-04-11 PROCEDURE — 83036 HEMOGLOBIN GLYCOSYLATED A1C: CPT | Performed by: INTERNAL MEDICINE

## 2023-04-11 PROCEDURE — 85025 COMPLETE CBC W/AUTO DIFF WBC: CPT | Performed by: INTERNAL MEDICINE

## 2023-04-11 PROCEDURE — 36415 COLL VENOUS BLD VENIPUNCTURE: CPT | Mod: PO | Performed by: INTERNAL MEDICINE

## 2023-04-11 PROCEDURE — 80053 COMPREHEN METABOLIC PANEL: CPT | Performed by: INTERNAL MEDICINE

## 2023-04-11 PROCEDURE — 84443 ASSAY THYROID STIM HORMONE: CPT | Performed by: INTERNAL MEDICINE

## 2023-04-18 ENCOUNTER — OFFICE VISIT (OUTPATIENT)
Dept: INTERNAL MEDICINE | Facility: CLINIC | Age: 78
End: 2023-04-18
Payer: MEDICARE

## 2023-04-18 VITALS
RESPIRATION RATE: 16 BRPM | HEIGHT: 61 IN | HEART RATE: 67 BPM | BODY MASS INDEX: 27.34 KG/M2 | DIASTOLIC BLOOD PRESSURE: 72 MMHG | TEMPERATURE: 98 F | SYSTOLIC BLOOD PRESSURE: 126 MMHG | OXYGEN SATURATION: 97 % | WEIGHT: 144.81 LBS

## 2023-04-18 DIAGNOSIS — K21.9 LARYNGOPHARYNGEAL REFLUX: ICD-10-CM

## 2023-04-18 DIAGNOSIS — R05.9 COUGH, UNSPECIFIED TYPE: ICD-10-CM

## 2023-04-18 DIAGNOSIS — I10 PRIMARY HYPERTENSION: Primary | ICD-10-CM

## 2023-04-18 DIAGNOSIS — J30.9 ALLERGIC RHINITIS, UNSPECIFIED SEASONALITY, UNSPECIFIED TRIGGER: ICD-10-CM

## 2023-04-18 PROCEDURE — 99999 PR PBB SHADOW E&M-EST. PATIENT-LVL IV: CPT | Mod: PBBFAC,,, | Performed by: INTERNAL MEDICINE

## 2023-04-18 PROCEDURE — 99999 PR PBB SHADOW E&M-EST. PATIENT-LVL IV: ICD-10-PCS | Mod: PBBFAC,,, | Performed by: INTERNAL MEDICINE

## 2023-04-18 PROCEDURE — 99214 OFFICE O/P EST MOD 30 MIN: CPT | Mod: PBBFAC,PO | Performed by: INTERNAL MEDICINE

## 2023-04-18 PROCEDURE — 99214 OFFICE O/P EST MOD 30 MIN: CPT | Mod: S$PBB,,, | Performed by: INTERNAL MEDICINE

## 2023-04-18 PROCEDURE — 99214 PR OFFICE/OUTPT VISIT, EST, LEVL IV, 30-39 MIN: ICD-10-PCS | Mod: S$PBB,,, | Performed by: INTERNAL MEDICINE

## 2023-04-18 RX ORDER — TRIAMCINOLONE ACETONIDE 1 MG/G
CREAM TOPICAL 2 TIMES DAILY
Qty: 30 G | Refills: 0 | Status: SHIPPED | OUTPATIENT
Start: 2023-04-18 | End: 2024-01-02

## 2023-04-18 RX ORDER — AMLODIPINE BESYLATE 2.5 MG/1
2.5 TABLET ORAL DAILY
Qty: 90 TABLET | Refills: 3 | Status: SHIPPED | OUTPATIENT
Start: 2023-04-18 | End: 2023-07-19

## 2023-04-18 RX ORDER — AZELASTINE 1 MG/ML
1 SPRAY, METERED NASAL 2 TIMES DAILY
Qty: 30 ML | Refills: 11 | Status: SHIPPED | OUTPATIENT
Start: 2023-04-18 | End: 2024-01-02

## 2023-05-09 RX ORDER — PANTOPRAZOLE SODIUM 40 MG/1
TABLET, DELAYED RELEASE ORAL
Qty: 90 TABLET | Refills: 3 | Status: SHIPPED | OUTPATIENT
Start: 2023-05-09

## 2023-05-09 NOTE — TELEPHONE ENCOUNTER
Refill Decision Note      Refill Decision Note   Laurie Hicks  is requesting a refill authorization.  Brief Assessment and Rationale for Refill:  Approve     Medication Therapy Plan:         Comments:     Note composed:5:46 AM 05/09/2023             Appointments     Last Visit   4/18/2023 Antonino Mesa MD   Next Visit   Visit date not found Antonino Mesa MD

## 2023-05-23 NOTE — PROGRESS NOTES
Subjective:       Patient ID: Laurie Hicks is a 77 y.o. female.    Chief Complaint: Annual Exam    HPI  The patient presents for annual examination and follow-up of medical conditions which include hypertension, chronic rhinitis, cough.  The patient does not have a history of diabetes, hypertension, asthma, or heart disease.  She does have chronic rhinitis with postnasal drainage and intermittent congestion.  She sees her ENT specialist who has recommended Protonix to use as needed which she has cough.  Symptoms suggest laryngopharyngeal reflux.  The patient reports her exercise tolerance is unchanged.  She does not experience exertional chest pain or dyspnea.  She denies having any chest congestion.  She is resting well at night.    Screening tests were reviewed.  Her bone density test was ordered and it is still pending.    Immunization record was reviewed.    No interval change in past medical history, family history, or social history since prior evaluations.    Review of Systems   Constitutional:  Negative for activity change, appetite change, fatigue and unexpected weight change.   HENT:  Positive for nasal congestion, postnasal drip and rhinorrhea.    Eyes:  Negative for visual disturbance.   Respiratory:  Negative for cough and shortness of breath.    Cardiovascular:  Negative for chest pain, palpitations and leg swelling.   Gastrointestinal:  Positive for reflux. Negative for abdominal pain, blood in stool, constipation and diarrhea.   Genitourinary:  Negative for dysuria and hematuria.   Musculoskeletal:  Negative for arthralgias, neck pain and neck stiffness.   Integumentary:  Negative for rash.   Neurological:  Negative for dizziness, syncope and headaches.   Psychiatric/Behavioral:  Negative for sleep disturbance.           Physical Exam  Vitals and nursing note reviewed.   Constitutional:       General: She is not in acute distress.     Appearance: Normal appearance. She is well-developed.       Comments: The patient's weight has remained stable since 03/28/2022.   HENT:      Head: Normocephalic and atraumatic.   Eyes:      General: No scleral icterus.     Extraocular Movements: Extraocular movements intact.      Conjunctiva/sclera: Conjunctivae normal.   Neck:      Thyroid: No thyromegaly.      Vascular: No JVD.   Cardiovascular:      Rate and Rhythm: Normal rate and regular rhythm.      Heart sounds: Normal heart sounds. No murmur heard.    No friction rub. No gallop.   Pulmonary:      Effort: Pulmonary effort is normal. No respiratory distress.      Breath sounds: Normal breath sounds. No wheezing or rales.   Abdominal:      General: Bowel sounds are normal.      Palpations: Abdomen is soft. There is no mass.      Tenderness: There is no abdominal tenderness.   Musculoskeletal:         General: No tenderness. Normal range of motion.      Cervical back: Normal range of motion and neck supple.      Right lower leg: No edema.      Left lower leg: No edema.      Comments: Back:  Negative straight leg raising test bilaterally.    Hips:  Range of motion is intact bilaterally.  Negative PHILL test.    Knees:  No tenderness on flexion and extension.  No effusions are present.   Lymphadenopathy:      Cervical: No cervical adenopathy.   Skin:     General: Skin is warm and dry.      Findings: Rash present.      Comments: A scaly plaque is noted on the dorsal aspect of the right elbow.   Neurological:      Mental Status: She is alert and oriented to person, place, and time.   Psychiatric:         Mood and Affect: Mood normal.         Behavior: Behavior normal.         Lab Visit on 04/11/2023   Component Date Value Ref Range Status    Specimen UA 04/11/2023 Urine, Clean Catch   Final    Color, UA 04/11/2023 Yellow  Yellow, Straw, Ludmila Final    Appearance, UA 04/11/2023 Hazy (A)  Clear Final    pH, UA 04/11/2023 6.0  5.0 - 8.0 Final    Specific Gravity, UA 04/11/2023 1.015  1.005 - 1.030 Final    Protein, UA  04/11/2023 Negative  Negative Final    Comment: Recommend a 24 hour urine protein or a urine   protein/creatinine ratio if globulin induced proteinuria is  clinically suspected.      Glucose, UA 04/11/2023 Negative  Negative Final    Ketones, UA 04/11/2023 Negative  Negative Final    Bilirubin (UA) 04/11/2023 Negative  Negative Final    Occult Blood UA 04/11/2023 1+ (A)  Negative Final    Nitrite, UA 04/11/2023 Negative  Negative Final    Leukocytes, UA 04/11/2023 2+ (A)  Negative Final    RBC, UA 04/11/2023 3  0 - 4 /hpf Final    WBC, UA 04/11/2023 17 (H)  0 - 5 /hpf Final    Bacteria 04/11/2023 Occasional  None-Occ /hpf Final    Squam Epithel, UA 04/11/2023 11  /hpf Final    Non-Squam Epith 04/11/2023 2 (A)  <1/hpf /hpf Final    Microscopic Comment 04/11/2023 SEE COMMENT   Final    Comment: Other formed elements not mentioned in the report are not   present in the microscopic examination.      Lab Visit on 04/11/2023   Component Date Value Ref Range Status    Sodium 04/11/2023 139  136 - 145 mmol/L Final    Potassium 04/11/2023 3.9  3.5 - 5.1 mmol/L Final    Chloride 04/11/2023 104  95 - 110 mmol/L Final    CO2 04/11/2023 26  23 - 29 mmol/L Final    Glucose 04/11/2023 67 (L)  70 - 110 mg/dL Final    BUN 04/11/2023 13  8 - 23 mg/dL Final    Creatinine 04/11/2023 0.7  0.5 - 1.4 mg/dL Final    Calcium 04/11/2023 9.5  8.7 - 10.5 mg/dL Final    Total Protein 04/11/2023 7.0  6.0 - 8.4 g/dL Final    Albumin 04/11/2023 3.8  3.5 - 5.2 g/dL Final    Total Bilirubin 04/11/2023 0.9  0.1 - 1.0 mg/dL Final    Comment: For infants and newborns, interpretation of results should be based  on gestational age, weight and in agreement with clinical  observations.    Premature Infant recommended reference ranges:  Up to 24 hours.............<8.0 mg/dL  Up to 48 hours............<12.0 mg/dL  3-5 days..................<15.0 mg/dL  6-29 days.................<15.0 mg/dL      Alkaline Phosphatase 04/11/2023 86  55 - 135 U/L Final    AST  04/11/2023 19  10 - 40 U/L Final    ALT 04/11/2023 11  10 - 44 U/L Final    Anion Gap 04/11/2023 9  8 - 16 mmol/L Final    eGFR 04/11/2023 >60.0  >60 mL/min/1.73 m^2 Final    Cholesterol 04/11/2023 194  120 - 199 mg/dL Final    Comment: The National Cholesterol Education Program (NCEP) has set the  following guidelines (reference ranges) for Cholesterol:  Optimal.....................<200 mg/dL  Borderline High.............200-239 mg/dL  High........................> or = 240 mg/dL      Triglycerides 04/11/2023 113  30 - 150 mg/dL Final    Comment: The National Cholesterol Education Program (NCEP) has set the  following guidelines (reference values) for triglycerides:  Normal......................<150 mg/dL  Borderline High.............150-199 mg/dL  High........................200-499 mg/dL      HDL 04/11/2023 65  40 - 75 mg/dL Final    Comment: The National Cholesterol Education Program (NCEP) has set the  following guidelines (reference values) for HDL Cholesterol:  Low...............<40 mg/dL  Optimal...........>60 mg/dL      LDL Cholesterol 04/11/2023 106.4  63.0 - 159.0 mg/dL Final    Comment: The National Cholesterol Education Program (NCEP) has set the  following guidelines (reference values) for LDL Cholesterol:  Optimal.......................<130 mg/dL  Borderline High...............130-159 mg/dL  High..........................160-189 mg/dL  Very High.....................>190 mg/dL      HDL/Cholesterol Ratio 04/11/2023 33.5  20.0 - 50.0 % Final    Total Cholesterol/HDL Ratio 04/11/2023 3.0  2.0 - 5.0 Final    Non-HDL Cholesterol 04/11/2023 129  mg/dL Final    Comment: Risk category and Non-HDL cholesterol goals:  Coronary heart disease (CHD)or equivalent (10-year risk of CHD >20%):  Non-HDL cholesterol goal     <130 mg/dL  Two or more CHD risk factors and 10-year risk of CHD <= 20%:  Non-HDL cholesterol goal     <160 mg/dL  0 to 1 CHD risk factor:  Non-HDL cholesterol goal     <190 mg/dL      WBC 04/11/2023  9.14  3.90 - 12.70 K/uL Final    RBC 04/11/2023 4.64  4.00 - 5.40 M/uL Final    Hemoglobin 04/11/2023 14.2  12.0 - 16.0 g/dL Final    Hematocrit 04/11/2023 44.9  37.0 - 48.5 % Final    MCV 04/11/2023 97  82 - 98 fL Final    MCH 04/11/2023 30.6  27.0 - 31.0 pg Final    MCHC 04/11/2023 31.6 (L)  32.0 - 36.0 g/dL Final    RDW 04/11/2023 13.2  11.5 - 14.5 % Final    Platelets 04/11/2023 306  150 - 450 K/uL Final    MPV 04/11/2023 10.6  9.2 - 12.9 fL Final    Immature Granulocytes 04/11/2023 0.4  0.0 - 0.5 % Final    Gran # (ANC) 04/11/2023 6.1  1.8 - 7.7 K/uL Final    Immature Grans (Abs) 04/11/2023 0.04  0.00 - 0.04 K/uL Final    Comment: Mild elevation in immature granulocytes is non specific and   can be seen in a variety of conditions including stress response,   acute inflammation, trauma and pregnancy. Correlation with other   laboratory and clinical findings is essential.      Lymph # 04/11/2023 1.9  1.0 - 4.8 K/uL Final    Mono # 04/11/2023 0.7  0.3 - 1.0 K/uL Final    Eos # 04/11/2023 0.3  0.0 - 0.5 K/uL Final    Baso # 04/11/2023 0.08  0.00 - 0.20 K/uL Final    nRBC 04/11/2023 0  0 /100 WBC Final    Gran % 04/11/2023 66.8  38.0 - 73.0 % Final    Lymph % 04/11/2023 20.2  18.0 - 48.0 % Final    Mono % 04/11/2023 8.1  4.0 - 15.0 % Final    Eosinophil % 04/11/2023 3.6  0.0 - 8.0 % Final    Basophil % 04/11/2023 0.9  0.0 - 1.9 % Final    Differential Method 04/11/2023 Automated   Final    TSH 04/11/2023 0.994  0.400 - 4.000 uIU/mL Final    Free T4 04/11/2023 1.07  0.71 - 1.51 ng/dL Final    Hemoglobin A1C 04/11/2023 5.0  4.0 - 5.6 % Final    Comment: ADA Screening Guidelines:  5.7-6.4%  Consistent with prediabetes  >or=6.5%  Consistent with diabetes    High levels of fetal hemoglobin interfere with the HbA1C  assay. Heterozygous hemoglobin variants (HbS, HgC, etc)do  not significantly interfere with this assay.   However, presence of multiple variants may affect accuracy.      Estimated Avg Glucose 04/11/2023 97   68 - 131 mg/dL Final       Assessment & Plan:      Laurie was seen today for annual exam.  Amlodipine will be renewed for treatment of hypertension.  Triamcinolone cream will be ordered for the skin rash.  The patient can continue Xyzal for chronic rhinitis symptoms.  Astelin nasal spray will also be added to help reduce postnasal drainage.  Protonix may be used as needed for reflux symptoms.    Diagnoses and all orders for this visit:    Primary hypertension    Allergic rhinitis, unspecified seasonality, unspecified trigger    Cough, unspecified type    Laryngopharyngeal reflux    Other orders  -     amLODIPine (NORVASC) 2.5 MG tablet; Take 1 tablet (2.5 mg total) by mouth once daily. For blood pressure control.  -     triamcinolone acetonide 0.1% (KENALOG) 0.1 % cream; Apply topically 2 (two) times daily.  -     azelastine (ASTELIN) 137 mcg (0.1 %) nasal spray; 1 spray (137 mcg total) by Nasal route 2 (two) times daily.         Follow up in about 1 year (around 4/18/2024).     Antonino Mesa MD

## 2023-06-09 ENCOUNTER — OFFICE VISIT (OUTPATIENT)
Dept: DERMATOLOGY | Facility: CLINIC | Age: 78
End: 2023-06-09
Payer: MEDICARE

## 2023-06-09 DIAGNOSIS — L82.1 SK (SEBORRHEIC KERATOSIS): ICD-10-CM

## 2023-06-09 DIAGNOSIS — Z12.83 SKIN CANCER SCREENING: Primary | ICD-10-CM

## 2023-06-09 DIAGNOSIS — D22.9 MULTIPLE BENIGN NEVI: ICD-10-CM

## 2023-06-09 DIAGNOSIS — D23.9 DERMATOFIBROMA: ICD-10-CM

## 2023-06-09 DIAGNOSIS — D18.01 CHERRY ANGIOMA: ICD-10-CM

## 2023-06-09 PROCEDURE — 99999 PR PBB SHADOW E&M-EST. PATIENT-LVL III: ICD-10-PCS | Mod: PBBFAC,,, | Performed by: DERMATOLOGY

## 2023-06-09 PROCEDURE — 99203 OFFICE O/P NEW LOW 30 MIN: CPT | Mod: S$PBB,,, | Performed by: DERMATOLOGY

## 2023-06-09 PROCEDURE — 99203 PR OFFICE/OUTPT VISIT, NEW, LEVL III, 30-44 MIN: ICD-10-PCS | Mod: S$PBB,,, | Performed by: DERMATOLOGY

## 2023-06-09 PROCEDURE — 99999 PR PBB SHADOW E&M-EST. PATIENT-LVL III: CPT | Mod: PBBFAC,,, | Performed by: DERMATOLOGY

## 2023-06-09 PROCEDURE — 99213 OFFICE O/P EST LOW 20 MIN: CPT | Mod: PBBFAC | Performed by: DERMATOLOGY

## 2023-06-09 NOTE — PROGRESS NOTES
Subjective:      Patient ID:  Laurie Hicks is a 77 y.o. female who presents for   Chief Complaint   Patient presents with    Skin Check     TBSE     HPI  Pt here today for a TBSE with a few areas of concern.   Patient with new complaint of lesion(s)  Location: R cheek/ jawline  Duration: 3/4 months  Symptoms: scaly  Relieving factors/Previous treatments: Otc cream    Patient with new complaint of lesion(s)  Location: l leg  Duration: months  Symptoms: pt fell and it appeared. Area is painful sometimes  Relieving factors/Previous treatments: none      Review of Systems   Constitutional:  Negative for fever, chills, weight loss, weight gain, fatigue, night sweats and malaise.   Skin:  Negative for daily sunscreen use, activity-related sunscreen use, recent sunburn and wears hat.   Hematologic/Lymphatic: Does not bruise/bleed easily.     Objective:   Physical Exam   Constitutional: She appears well-developed and well-nourished. No distress.   Neurological: She is alert and oriented to person, place, and time. She is not disoriented.   Psychiatric: She has a normal mood and affect.   Skin:   Areas Examined (abnormalities noted in diagram):   Scalp / Hair Palpated and Inspected  Head / Face Inspection Performed  Neck Inspection Performed  Chest / Axilla Inspection Performed  Abdomen Inspection Performed  Genitals / Buttocks / Groin Inspection Performed  Back Inspection Performed  RUE Inspected  LUE Inspection Performed  RLE Inspected  LLE Inspection Performed  Nails and Digits Inspection Performed               Diagram Legend     Erythematous scaling macule/papule c/w actinic keratosis       Vascular papule c/w angioma      Pigmented verrucoid papule/plaque c/w seborrheic keratosis      Yellow umbilicated papule c/w sebaceous hyperplasia      Irregularly shaped tan macule c/w lentigo     1-2 mm smooth white papules consistent with Milia      Movable subcutaneous cyst with punctum c/w epidermal inclusion cyst       Subcutaneous movable cyst c/w pilar cyst      Firm pink to brown papule c/w dermatofibroma      Pedunculated fleshy papule(s) c/w skin tag(s)      Evenly pigmented macule c/w junctional nevus     Mildly variegated pigmented, slightly irregular-bordered macule c/w mildly atypical nevus      Flesh colored to evenly pigmented papule c/w intradermal nevus       Pink pearly papule/plaque c/w basal cell carcinoma      Erythematous hyperkeratotic cursted plaque c/w SCC      Surgical scar with no sign of skin cancer recurrence      Open and closed comedones      Inflammatory papules and pustules      Verrucoid papule consistent consistent with wart     Erythematous eczematous patches and plaques     Dystrophic onycholytic nail with subungual debris c/w onychomycosis     Umbilicated papule    Erythematous-base heme-crusted tan verrucoid plaque consistent with inflamed seborrheic keratosis     Erythematous Silvery Scaling Plaque c/w Psoriasis     See annotation      Assessment / Plan:        Skin cancer screening  Total body skin examination performed today including at least 12 points as noted in physical examination. No lesions suspicious for malignancy noted.  Patient instructed in importance of daily broad spectrum sunscreen use with spf at least 30. Sun avoidance and topical protection/protective clothing discussed.    SK (seborrheic keratosis)  These are benign inherited growths without a malignant potential. Reassurance given to patient. No treatment is necessary.   Treatment of benign, asymptomatic lesions may be considered cosmetic.  Warned about risk of hypo- or hyperpigmentation with treatment and risk of recurrence.    Multiple benign nevi  Benign-appearing nevi present on exam today. Reassurance provided. Periodically examine moles and return to clinic if any moles change or become symptomatic (bleeding, itching, pain, etc).    Cherry angioma  This is a benign vascular lesion. Reassurance given. No treatment  required. Treatment of benign, asymptomatic lesions may be considered cosmetic.    Dermatofibroma  Reassurance given to patient. No treatment is necessary.  This is benign scar tissue from previous minor trauma to the skin such as a bug bite.    Follow up in about 1 year (around 6/9/2024) for skin check or sooner for any concerns.

## 2023-06-09 NOTE — PATIENT INSTRUCTIONS

## 2023-07-19 RX ORDER — AMLODIPINE BESYLATE 2.5 MG/1
TABLET ORAL
Qty: 90 TABLET | Refills: 2 | Status: SHIPPED | OUTPATIENT
Start: 2023-07-19 | End: 2024-03-26

## 2023-07-19 NOTE — TELEPHONE ENCOUNTER
No care due was identified.  [facility] Embedded Care Due Messages. Reference number: [id number].   7/19/2023 9:40:29 AM CDT

## 2023-07-19 NOTE — TELEPHONE ENCOUNTER
Refill Decision Note   Laurie Hicks  is requesting a refill authorization.  Brief Assessment and Rationale for Refill:  Approve     Medication Therapy Plan:         Comments:     Note composed:11:30 AM 07/19/2023             Appointments     Last Visit   4/18/2023 Antonino Mesa MD   Next Visit   Visit date not found Antonino Mesa MD

## 2023-09-20 DIAGNOSIS — Z78.0 MENOPAUSE: ICD-10-CM

## 2023-10-20 ENCOUNTER — OFFICE VISIT (OUTPATIENT)
Dept: URGENT CARE | Facility: CLINIC | Age: 78
End: 2023-10-20
Payer: MEDICARE

## 2023-10-20 VITALS
BODY MASS INDEX: 27.19 KG/M2 | OXYGEN SATURATION: 98 % | RESPIRATION RATE: 18 BRPM | SYSTOLIC BLOOD PRESSURE: 155 MMHG | HEART RATE: 67 BPM | TEMPERATURE: 99 F | HEIGHT: 61 IN | DIASTOLIC BLOOD PRESSURE: 80 MMHG | WEIGHT: 144 LBS

## 2023-10-20 DIAGNOSIS — T78.40XA ALLERGIC REACTION, INITIAL ENCOUNTER: Primary | ICD-10-CM

## 2023-10-20 PROCEDURE — 99214 PR OFFICE/OUTPT VISIT, EST, LEVL IV, 30-39 MIN: ICD-10-PCS | Mod: 25,S$GLB,, | Performed by: PHYSICIAN ASSISTANT

## 2023-10-20 PROCEDURE — 96372 THER/PROPH/DIAG INJ SC/IM: CPT | Mod: S$GLB,,, | Performed by: PHYSICIAN ASSISTANT

## 2023-10-20 PROCEDURE — 96372 PR INJECTION,THERAP/PROPH/DIAG2ST, IM OR SUBCUT: ICD-10-PCS | Mod: S$GLB,,, | Performed by: PHYSICIAN ASSISTANT

## 2023-10-20 PROCEDURE — 99214 OFFICE O/P EST MOD 30 MIN: CPT | Mod: 25,S$GLB,, | Performed by: PHYSICIAN ASSISTANT

## 2023-10-20 RX ORDER — DEXAMETHASONE SODIUM PHOSPHATE 4 MG/ML
4 INJECTION, SOLUTION INTRA-ARTICULAR; INTRALESIONAL; INTRAMUSCULAR; INTRAVENOUS; SOFT TISSUE
Status: DISCONTINUED | OUTPATIENT
Start: 2023-10-20 | End: 2023-10-20

## 2023-10-20 RX ORDER — BETAMETHASONE SODIUM PHOSPHATE AND BETAMETHASONE ACETATE 3; 3 MG/ML; MG/ML
6 INJECTION, SUSPENSION INTRA-ARTICULAR; INTRALESIONAL; INTRAMUSCULAR; SOFT TISSUE
Status: DISCONTINUED | OUTPATIENT
Start: 2023-10-20 | End: 2023-10-20

## 2023-10-20 RX ORDER — DEXAMETHASONE SODIUM PHOSPHATE 4 MG/ML
4 INJECTION, SOLUTION INTRA-ARTICULAR; INTRALESIONAL; INTRAMUSCULAR; INTRAVENOUS; SOFT TISSUE
Status: COMPLETED | OUTPATIENT
Start: 2023-10-20 | End: 2023-10-20

## 2023-10-20 RX ADMIN — DEXAMETHASONE SODIUM PHOSPHATE 4 MG: 4 INJECTION, SOLUTION INTRA-ARTICULAR; INTRALESIONAL; INTRAMUSCULAR; INTRAVENOUS; SOFT TISSUE at 12:10

## 2023-10-20 NOTE — PATIENT INSTRUCTIONS
You must understand that you've received an Urgent Care treatment only and that you may be released before all your medical problems are known or treated. You, the patient, will arrange for follow up care as instructed.    Follow up with your PCP or specialty clinic as directed in the next 1-2 weeks if not improved or as needed. You can call [phone] to schedule an appointment with the appropriate provider.    If your condition worsens we recommend that you receive another evaluation at the emergency room immediately or contact your primary medical clinic's after hours call service to discuss your concerns.    Please go to the Emergency Department for any concerns or worsening of condition.        You received a steroid shot today - this can elevate your blood pressure, elevate your blood sugar, water weight gain, nervous energy, redness to the face and dimpling of the skin where the shot goes in.       Please be aware your blood pressure was slightly elevated today -  Make sure to take your blood pressure medicines, eat a low salt diet and recheck your blood pressure to make sure it is not getting too elevated ( greater than 160/100). Also make sure to let your doctor know about the elevated reading.

## 2023-10-20 NOTE — PROGRESS NOTES
"Subjective:      Patient ID: Laurie Hicks is a 78 y.o. female.    Vitals:  height is 5' 1" (1.549 m) and weight is 65.3 kg (144 lb). Her oral temperature is 98.5 °F (36.9 °C). Her blood pressure is 155/80 (abnormal) and her pulse is 67. Her respiration is 18 and oxygen saturation is 98%.     Chief Complaint: Allergic Reaction    Pt c/o itching, facial rash, and facial redness after working in her garden yesterday.  She reports a similar reaction in the past and states she has a h/o allergies.  Pt states she is allergic to Benadryl and has only used ice compresses to her face to help with itching.  Denies throat swelling or difficulty breathing.    Allergic Reaction  This is a new problem. The current episode started yesterday. The problem has been gradually worsening since onset. The problem is mild. The exposure occurred at Home. Associated symptoms include eye itching, itching and a rash. Pertinent negatives include no abdominal pain, chest pain, chest pressure, coughing, diarrhea, difficulty breathing, drooling, eye redness, eye watering, globus sensation, hyperventilation, skin blistering, stridor, trouble swallowing, vomiting or wheezing. Past treatments include ibuprofen. The treatment provided mild relief. Her past medical history is significant for seasonal allergies. There is no history of asthma, atopic dermatitis, food allergies or medication allergies. Swelling is present on the eyes.       HENT:  Negative for drooling and trouble swallowing.    Cardiovascular:  Negative for chest pain.   Eyes:  Positive for eye itching. Negative for eye redness.   Respiratory:  Negative for cough, shortness of breath, stridor and wheezing.    Gastrointestinal:  Negative for abdominal pain, vomiting and diarrhea.   Skin:  Positive for rash and erythema.   Allergic/Immunologic: Positive for seasonal allergies. Negative for food allergies.      Objective:     Physical Exam   Constitutional: She is oriented to " person, place, and time. She appears well-developed.   HENT:   Head: Normocephalic and atraumatic. Head is without abrasion, without contusion and without laceration.   Ears:   Right Ear: External ear normal.   Left Ear: External ear normal.   Nose: Nose normal.   Mouth/Throat: Oropharynx is clear and moist and mucous membranes are normal. No posterior oropharyngeal edema.   Eyes: Conjunctivae, EOM and lids are normal. Pupils are equal, round, and reactive to light.   Neck: Trachea normal and phonation normal. Neck supple.   Cardiovascular: Normal rate, regular rhythm and normal heart sounds.   Pulmonary/Chest: Effort normal and breath sounds normal. No stridor. No respiratory distress. She has no decreased breath sounds. She has no wheezes. She has no rhonchi. She has no rales.   Musculoskeletal: Normal range of motion.         General: Normal range of motion.   Neurological: She is alert and oriented to person, place, and time.   Skin: Skin is warm, dry, intact, rash and maculopapular. Capillary refill takes less than 2 seconds. erythema No abrasion, No burn, No bruising and No ecchymosis        Psychiatric: Her speech is normal and behavior is normal. Judgment and thought content normal.   Nursing note and vitals reviewed.      Assessment:     1. Allergic reaction, initial encounter        Plan:       Allergic reaction, initial encounter  -     Discontinue: betamethasone acetate-betamethasone sodium phosphate injection 6 mg  -     Discontinue: dexAMETHasone injection 4 mg  -     dexAMETHasone injection 4 mg          Medical Decision Making:   History:   Old Records Summarized: records from clinic visits.  Initial Assessment:   78 y.o. female with PMH sinusitis and HTN diagnosed with an allergic reaction.  Dexamethasone 4mg IM administered.  Adverse effects of systemic steroids discussed with pt.  Continue cool compresses to face.  Strong ER precautions given.  Go to ER if SOB, throat swelling, or difficulty  breathing.  Urgent Care Management:  Dexamethasone 4mg IM         Pt seen with pre-PA student, Barbara Ramesh - shadowing only.    Patient Instructions   You must understand that you've received an Urgent Care treatment only and that you may be released before all your medical problems are known or treated. You, the patient, will arrange for follow up care as instructed.    Follow up with your PCP or specialty clinic as directed in the next 1-2 weeks if not improved or as needed. You can call [phone] to schedule an appointment with the appropriate provider.    If your condition worsens we recommend that you receive another evaluation at the emergency room immediately or contact your primary medical clinic's after hours call service to discuss your concerns.    Please go to the Emergency Department for any concerns or worsening of condition.        You received a steroid shot today - this can elevate your blood pressure, elevate your blood sugar, water weight gain, nervous energy, redness to the face and dimpling of the skin where the shot goes in.       Please be aware your blood pressure was slightly elevated today -  Make sure to take your blood pressure medicines, eat a low salt diet and recheck your blood pressure to make sure it is not getting too elevated ( greater than 160/100). Also make sure to let your doctor know about the elevated reading.      Patient Education        Allergic Reaction ED   General Information   You came to the Emergency Department (ED) for an allergic reaction. This is your bodys response to an allergen. Some people have a rash, hives, trouble breathing, or swelling. Others may throw up, feel dizzy, or pass out. This problem can be caused by things like:  Food.  Medicine.  Insect stings or bites.  Exercise.  Latex.  Triggers that cant be identified at the time.  Some people can come in contact with these things and have no problems. But when you have an allergy to something,  your body acts as if the substance is harming you. This causes symptoms.  What care is needed at home?   Call your regular doctor to let them know you were in the ED. Make a follow-up appointment if you were told to.  If you were told to see an allergist, ask your regular doctor for a referral.  If you were prescribed an epinephrine autoinjector, fill the prescription right away. Make sure you know how to use it.  Avoid the allergen if you know what caused your reaction. You may need to work with your regular doctor or an allergist to find what has caused your reaction. Then you can try to avoid it in the future.  Use a cool washcloth on your eyes or skin.  If possible, rest for the next few days.  Use over-the-counter medicines to help with your milder symptoms.  Use antihistamine eye drops to help with itching and hives.  When do I need to get emergency help?   Call an ambulance right away if:   You have signs of a severe reaction like:  You have trouble breathing, wheezing, or have a cough that wont stop.  You feel like your throat is closing or your lips or tongue are swelling.  You feel very weak like you are going to pass out, or actually pass out.  If you have signs of a severe allergic reaction, use your epinephrine autoinjector right away if you have one, then call for an ambulance.  When do I need to call the doctor?   You are not  having a severe reaction but do have other signs of an allergic reaction, such as:  You have a rash, skin redness, flushing, or hives.  Your skin itches.  You have belly pain or an upset stomach.  You are not feeling better in 2 to 3 days.  You have new or worsening symptoms.  Last Reviewed Date   2021-02-11  Consumer Information Use and Disclaimer   This information is not specific medical advice and does not replace information you receive from your health care provider. This is only a brief summary of general information. It does NOT include all information about conditions,  illnesses, injuries, tests, procedures, treatments, therapies, discharge instructions or life-style choices that may apply to you. You must talk with your health care provider for complete information about your health and treatment options. This information should not be used to decide whether or not to accept your health care providers advice, instructions or recommendations. Only your health care provider has the knowledge and training to provide advice that is right for you.  Copyright   Copyright © 2021 [employer] and its affiliates and/or licensors. All rights reserved.

## 2023-11-25 ENCOUNTER — OFFICE VISIT (OUTPATIENT)
Dept: URGENT CARE | Facility: CLINIC | Age: 78
End: 2023-11-25
Payer: MEDICARE

## 2023-11-25 VITALS
OXYGEN SATURATION: 98 % | BODY MASS INDEX: 27.21 KG/M2 | RESPIRATION RATE: 16 BRPM | HEART RATE: 91 BPM | DIASTOLIC BLOOD PRESSURE: 88 MMHG | TEMPERATURE: 97 F | WEIGHT: 144 LBS | SYSTOLIC BLOOD PRESSURE: 165 MMHG

## 2023-11-25 DIAGNOSIS — L25.9 CONTACT DERMATITIS, UNSPECIFIED CONTACT DERMATITIS TYPE, UNSPECIFIED TRIGGER: Primary | ICD-10-CM

## 2023-11-25 PROCEDURE — 99213 OFFICE O/P EST LOW 20 MIN: CPT | Mod: S$GLB,,, | Performed by: FAMILY MEDICINE

## 2023-11-25 PROCEDURE — 99213 PR OFFICE/OUTPT VISIT, EST, LEVL III, 20-29 MIN: ICD-10-PCS | Mod: S$GLB,,, | Performed by: FAMILY MEDICINE

## 2023-11-25 RX ORDER — HYDROCORTISONE 25 MG/G
CREAM TOPICAL 2 TIMES DAILY
Qty: 20 G | Refills: 1 | Status: SHIPPED | OUTPATIENT
Start: 2023-11-25

## 2023-11-25 NOTE — PATIENT INSTRUCTIONS
About half of all lupus patients experience a characteristic rash called the malar or butterfly rash that may occur spontaneously or after exposure to the sun. This rash is so-named because it resembles a butterfly, spanning the width of the face and covering both cheeks and the bridge of the nose. Please schedule an appointment with your dermatologist or primary care physician to rule out this possibility if rash persists or worsens.

## 2023-11-25 NOTE — PROGRESS NOTES
Subjective:      Patient ID: Laurie Hicks is a 78 y.o. female.    Vitals:  weight is 65.3 kg (144 lb). Her oral temperature is 97 °F (36.1 °C). Her blood pressure is 165/88 (abnormal) and her pulse is 91. Her respiration is 16 and oxygen saturation is 98%.     Chief Complaint: Rash    This is a 78 y.o. female who presents today with a chief complaint of rash to both cheeks since yesterday post working in her garden. Pt has had this rxn before, is not sure what causes it. Rash appears as red, large patches, swelling and itches. Pt says it feels hot to the touch at times but no px.     Home tx: ice    PMH: chronic post nasal drip    Rash  This is a new problem. The current episode started yesterday. The problem has been gradually worsening since onset. The affected locations include the face. The rash is characterized by redness, swelling and itchiness. It is unknown if there was an exposure to a precipitant. Associated symptoms include rhinorrhea. Pertinent negatives include no cough, fatigue or fever.       Constitution: Negative for fatigue and fever.   Respiratory:  Negative for cough.    Skin:  Positive for rash and erythema.      Objective:     Physical Exam   Constitutional: She does not appear ill. No distress. normal  Neck: Neck supple.   Cardiovascular: Normal rate, regular rhythm, normal heart sounds and normal pulses.   Abdominal: Normal appearance.   Neurological: She is alert.   Skin: Skin is rash (malar mary on cheeks bilaterally). erythema   Nursing note and vitals reviewed.      Assessment:     1. Contact dermatitis, unspecified contact dermatitis type, unspecified trigger      To schedule appointment with PCP to evaluate rash if persists  Plan:       Contact dermatitis, unspecified contact dermatitis type, unspecified trigger  -     hydrocortisone 2.5 % cream; Apply topically 2 (two) times daily.  Dispense: 20 g; Refill: 1

## 2023-12-06 ENCOUNTER — OFFICE VISIT (OUTPATIENT)
Dept: DERMATOLOGY | Facility: CLINIC | Age: 78
End: 2023-12-06
Payer: MEDICARE

## 2023-12-06 VITALS — WEIGHT: 144 LBS | BODY MASS INDEX: 27.21 KG/M2

## 2023-12-06 DIAGNOSIS — L71.9 ROSACEA: ICD-10-CM

## 2023-12-06 DIAGNOSIS — L30.9 DERMATITIS: Primary | ICD-10-CM

## 2023-12-06 PROCEDURE — 99214 OFFICE O/P EST MOD 30 MIN: CPT | Mod: S$PBB,,, | Performed by: DERMATOLOGY

## 2023-12-06 PROCEDURE — 99999 PR PBB SHADOW E&M-EST. PATIENT-LVL III: CPT | Mod: PBBFAC,,, | Performed by: DERMATOLOGY

## 2023-12-06 PROCEDURE — 99213 OFFICE O/P EST LOW 20 MIN: CPT | Mod: PBBFAC,PO | Performed by: DERMATOLOGY

## 2023-12-06 PROCEDURE — 99999 PR PBB SHADOW E&M-EST. PATIENT-LVL III: ICD-10-PCS | Mod: PBBFAC,,, | Performed by: DERMATOLOGY

## 2023-12-06 PROCEDURE — 99214 PR OFFICE/OUTPT VISIT, EST, LEVL IV, 30-39 MIN: ICD-10-PCS | Mod: S$PBB,,, | Performed by: DERMATOLOGY

## 2023-12-06 RX ORDER — PIMECROLIMUS 10 MG/G
CREAM TOPICAL
Qty: 60 G | Refills: 3 | Status: SHIPPED | OUTPATIENT
Start: 2023-12-06 | End: 2023-12-08 | Stop reason: SDUPTHER

## 2023-12-06 RX ORDER — METRONIDAZOLE 7.5 MG/G
GEL TOPICAL
Qty: 45 G | Refills: 3 | Status: SHIPPED | OUTPATIENT
Start: 2023-12-06 | End: 2023-12-08 | Stop reason: SDUPTHER

## 2023-12-06 NOTE — PROGRESS NOTES
Subjective:      Patient ID:  Laurie Hicks is a 78 y.o. female who presents for   Chief Complaint   Patient presents with    Follow-up     rash     Follow up dermatitis face, see notes Dr Hicks.  Redness and scale on cheek for several years. Had been using hc cream off an on had recent flare, went to urgent care and given triamcinolone cream applied one night and rash resolved.  Has + hx allergies/hay fever takes xyzal.  Her mom had rosacea.      Skin Discoloration - Initial  Affected locations: face      Review of Systems   Constitutional:  Negative for fever, chills, weight loss, weight gain, fatigue, night sweats and malaise.   Skin:  Negative for daily sunscreen use, activity-related sunscreen use and wears hat.   Hematologic/Lymphatic: Does not bruise/bleed easily.       Objective:   Physical Exam   Constitutional: She appears well-developed and well-nourished.   Neurological: She is alert and oriented to person, place, and time.   Psychiatric: She has a normal mood and affect.   Skin:   Areas Examined (abnormalities noted in diagram):   Head / Face Inspection Performed  Neck Inspection Performed            Diagram Legend     Erythematous scaling macule/papule c/w actinic keratosis       Vascular papule c/w angioma      Pigmented verrucoid papule/plaque c/w seborrheic keratosis      Yellow umbilicated papule c/w sebaceous hyperplasia      Irregularly shaped tan macule c/w lentigo     1-2 mm smooth white papules consistent with Milia      Movable subcutaneous cyst with punctum c/w epidermal inclusion cyst      Subcutaneous movable cyst c/w pilar cyst      Firm pink to brown papule c/w dermatofibroma      Pedunculated fleshy papule(s) c/w skin tag(s)      Evenly pigmented macule c/w junctional nevus     Mildly variegated pigmented, slightly irregular-bordered macule c/w mildly atypical nevus      Flesh colored to evenly pigmented papule c/w intradermal nevus       Pink pearly papule/plaque c/w basal  cell carcinoma      Erythematous hyperkeratotic cursted plaque c/w SCC      Surgical scar with no sign of skin cancer recurrence      Open and closed comedones      Inflammatory papules and pustules      Verrucoid papule consistent consistent with wart     Erythematous eczematous patches and plaques     Dystrophic onycholytic nail with subungual debris c/w onychomycosis     Umbilicated papule    Erythematous-base heme-crusted tan verrucoid plaque consistent with inflamed seborrheic keratosis     Erythematous Silvery Scaling Plaque c/w Psoriasis     See annotation      Assessment / Plan:        Dermatitis, likely atopic   Brochure provided    -     ELIDEL 1 % cream; Use bid prn rash  Dispense: 60 g; Refill: 3  use for flares  Vanicream for moisturizer  Dc using steroid creams on face  Call if continues to flare    Rosacea  -     metroNIDAZOLE (METROGEL) 0.75 % gel; Use on face hs  Dispense: 45 g; Refill: 3             Follow up if symptoms worsen or fail to improve.

## 2023-12-08 DIAGNOSIS — L30.9 DERMATITIS: Primary | ICD-10-CM

## 2023-12-08 RX ORDER — PIMECROLIMUS 10 MG/G
CREAM TOPICAL 2 TIMES DAILY
Qty: 30 G | Refills: 3 | Status: SHIPPED | OUTPATIENT
Start: 2023-12-08

## 2023-12-08 RX ORDER — METRONIDAZOLE 7.5 MG/G
CREAM TOPICAL
Qty: 45 G | Refills: 3 | Status: SHIPPED | OUTPATIENT
Start: 2023-12-08 | End: 2024-01-02

## 2023-12-29 ENCOUNTER — TELEPHONE (OUTPATIENT)
Dept: INTERNAL MEDICINE | Facility: CLINIC | Age: 78
End: 2023-12-29
Payer: COMMERCIAL

## 2023-12-29 NOTE — TELEPHONE ENCOUNTER
Confirmed I will get record release filled out  And have her sign Tuesday when we see her  So can fax off record request.

## 2023-12-29 NOTE — TELEPHONE ENCOUNTER
----- Message from Harleen Weathers sent at 12/29/2023 10:51 AM CST -----  Contact: [phone]  Patient is returning a phone call.  Who left a message for the patient: Sofi Choi MA  Does patient know what this is regarding:  yes   Would you like a call back, or a response through your [facility] portal?:   call back   Comments:   Pt states the fax number in [place]     Fax [phone]  Attn Medical records for [facility] of [facility]   Fax [phone]

## 2023-12-29 NOTE — TELEPHONE ENCOUNTER
----- Message from Sofi Choi MA sent at 12/29/2023 10:06 AM CST -----  Contact: [phone]    ----- Message -----  From: Ike Steele  Sent: 12/29/2023   9:33 AM CST  To: Moshe SOLIS Staff    1MEDICALADVICE     Patient is calling for Medical Advice regarding:pt would like neurologist  and urologist referral and wants to know who dr hickman recommends     How long has patient had these symptoms:    Pharmacy name and phone#:    Would like response via WeG[employer]:  call back    Comments:

## 2023-12-29 NOTE — TELEPHONE ENCOUNTER
Lov 4/18/23  Nov 4/29/24    She incident where eyes went blurry while in  [place].  [facility] took her to [facility].  Nephew made sure she got tested  For a lot of things.  They kept her overnight.  Nothing found as reason for this.    They recommend neurology and urologist.    Dx urinary infection and on 100mg nitrofuratoin  & plavix w/ baby aspirin 81mg    Clonidine 1mg bid.     Dr porter neurologist tested her.  She can't explain why on plavix and asa now.    I booked appt Tuesday to see dr hickman.  We had a laste minute cancellation.  Will get record release signed while here.

## 2024-01-02 ENCOUNTER — OFFICE VISIT (OUTPATIENT)
Dept: INTERNAL MEDICINE | Facility: CLINIC | Age: 79
End: 2024-01-02
Payer: MEDICARE

## 2024-01-02 VITALS
WEIGHT: 138.88 LBS | SYSTOLIC BLOOD PRESSURE: 154 MMHG | RESPIRATION RATE: 16 BRPM | HEART RATE: 72 BPM | DIASTOLIC BLOOD PRESSURE: 82 MMHG | BODY MASS INDEX: 26.22 KG/M2 | HEIGHT: 61 IN | TEMPERATURE: 97 F

## 2024-01-02 DIAGNOSIS — I10 PRIMARY HYPERTENSION: Primary | ICD-10-CM

## 2024-01-02 DIAGNOSIS — I10 PRIMARY HYPERTENSION: ICD-10-CM

## 2024-01-02 DIAGNOSIS — N30.00 ACUTE CYSTITIS WITHOUT HEMATURIA: ICD-10-CM

## 2024-01-02 DIAGNOSIS — R79.9 ABNORMAL FINDING OF BLOOD CHEMISTRY, UNSPECIFIED: ICD-10-CM

## 2024-01-02 DIAGNOSIS — R40.4 TRANSIENT ALTERATION OF AWARENESS: Primary | ICD-10-CM

## 2024-01-02 DIAGNOSIS — G47.00 INSOMNIA, UNSPECIFIED TYPE: ICD-10-CM

## 2024-01-02 PROCEDURE — 99999 PR PBB SHADOW E&M-EST. PATIENT-LVL V: CPT | Mod: PBBFAC,,, | Performed by: INTERNAL MEDICINE

## 2024-01-02 PROCEDURE — 99215 OFFICE O/P EST HI 40 MIN: CPT | Mod: PBBFAC,PO | Performed by: INTERNAL MEDICINE

## 2024-01-02 PROCEDURE — 99214 OFFICE O/P EST MOD 30 MIN: CPT | Mod: S$PBB,,, | Performed by: INTERNAL MEDICINE

## 2024-01-02 RX ORDER — ATORVASTATIN CALCIUM 40 MG/1
40 TABLET, FILM COATED ORAL DAILY
Qty: 90 TABLET | Refills: 3 | Status: SHIPPED | OUTPATIENT
Start: 2024-01-02 | End: 2025-01-01

## 2024-01-02 RX ORDER — CLOPIDOGREL BISULFATE 75 MG/1
75 TABLET ORAL DAILY
COMMUNITY
Start: 2023-12-27 | End: 2024-01-16

## 2024-01-02 RX ORDER — NITROFURANTOIN 25; 75 MG/1; MG/1
100 CAPSULE ORAL 2 TIMES DAILY
COMMUNITY
Start: 2023-12-26 | End: 2024-03-04

## 2024-01-02 RX ORDER — HYDROXYZINE PAMOATE 25 MG/1
25 CAPSULE ORAL NIGHTLY PRN
Qty: 30 CAPSULE | Refills: 2 | Status: SHIPPED | OUTPATIENT
Start: 2024-01-02

## 2024-01-02 RX ORDER — VALSARTAN 80 MG/1
80 TABLET ORAL DAILY
Qty: 90 TABLET | Refills: 3 | Status: SHIPPED | OUTPATIENT
Start: 2024-01-02 | End: 2025-01-01

## 2024-01-02 RX ORDER — NITROFURANTOIN 25; 75 MG/1; MG/1
100 CAPSULE ORAL 2 TIMES DAILY
Qty: 14 CAPSULE | Refills: 0 | Status: SHIPPED | OUTPATIENT
Start: 2024-01-02 | End: 2024-03-04

## 2024-01-02 RX ORDER — CLONIDINE HYDROCHLORIDE 0.1 MG/1
0.1 TABLET ORAL 2 TIMES DAILY PRN
COMMUNITY
Start: 2023-12-26 | End: 2024-06-23

## 2024-01-03 ENCOUNTER — TELEPHONE (OUTPATIENT)
Dept: NEUROLOGY | Facility: CLINIC | Age: 79
End: 2024-01-03
Payer: COMMERCIAL

## 2024-01-03 ENCOUNTER — TELEPHONE (OUTPATIENT)
Dept: INTERNAL MEDICINE | Facility: CLINIC | Age: 79
End: 2024-01-03
Payer: COMMERCIAL

## 2024-01-03 NOTE — TELEPHONE ENCOUNTER
"Called pt pre Dr. Guerrero's request to ask if pt has imaging disks for said appointment that was made same day earlier today. Imaging disk was needed for today's appointment to view the imaging scans taken to review over. Since pt was admitted in [place] and can't drive for 6 mos since d/c, she'll have to find time to go with someone to retrieve the disk when available. Moving her to resident clinic per Dr. Guerrero's request. Scheduled her down 1/25/24 @ 1pm with Dr. Nara Rapp. Pt verbalized understanding and agreed to moving appointment. Advised to give a call back if there is anything else needed from our end.     ----- Message from Melina Peter sent at 1/3/2024 11:18 AM CST -----  Regarding: missed call  Contact: [phone]  Name Of Caller: self     Contact Preference?:  [phone]     What is the nature of the call?: returning a call to Niecy     Additional Notes:    "Thank you for all that you do for our patients"        "

## 2024-01-03 NOTE — TELEPHONE ENCOUNTER
We don't have a disc of her hayley testing  Done.    We had a fax we rcvd yesterday with paper  Results.  Those are not in clinic at this time.  Dr hickman has them with him to complete  office note.    I left her msg to get dr fax number and call her  Contact to get results faxed to dr marti.    (A disc would have to be mailed and that can't be rcvd today before appt)

## 2024-01-03 NOTE — TELEPHONE ENCOUNTER
----- Message from Sofi Choi MA sent at 1/3/2024  1:18 PM CST -----  Contact: Patient @ [phone]    ----- Message -----  From: Meghan Retana  Sent: 1/3/2024  12:57 PM CST  To: Moshe SOLIS Staff    1MEDICALADVICE     Patient is calling for Medical Advice regarding:Patient has an appointment at 3 pm with  Katlyn Guerrero MD at [facility] and Patient will be cancel if Disc is not forwarded to them.    How long has patient had these symptoms:    Pharmacy name and phone#:    Would like response via Smallaat: call     Comments:

## 2024-01-04 ENCOUNTER — TELEPHONE (OUTPATIENT)
Dept: INTERNAL MEDICINE | Facility: CLINIC | Age: 79
End: 2024-01-04
Payer: COMMERCIAL

## 2024-01-04 NOTE — TELEPHONE ENCOUNTER
----- Message from Sofi Choi MA sent at 1/4/2024  8:45 AM CST -----  Contact: [phone]    ----- Message -----  From: Harleen Weathers  Sent: 1/4/2024   8:39 AM CST  To: Moshe SOLIS Staff    1MEDICALADVICE     Patient is calling for Medical Advice regarding:message for Mrs Farah     How long has patient had these symptoms:    Pharmacy name and phone#:    Would like response via Delectablet:  no     Comments:  Pt is calling to give Mrs Farah some information the office is needing for the pt    Test she took at [facility]    Fax [phone]   Phone [phone]

## 2024-01-09 ENCOUNTER — TELEPHONE (OUTPATIENT)
Dept: NEUROLOGY | Facility: CLINIC | Age: 79
End: 2024-01-09
Payer: COMMERCIAL

## 2024-01-09 NOTE — TELEPHONE ENCOUNTER
----- Message from Cheryle Hsieh sent at 1/9/2024  8:59 AM CST -----  Regarding: pt advice  Contact: pt [phone]  Pt calling to inquire if provider wants MRI and CT disk now or wait until her 1/25 appt. Pls call

## 2024-01-11 DIAGNOSIS — Z00.00 ENCOUNTER FOR MEDICARE ANNUAL WELLNESS EXAM: ICD-10-CM

## 2024-01-16 NOTE — TELEPHONE ENCOUNTER
Received disc from [facility] upstairs since they assumed it belonged to LESIA Yeung at first. Just gave a call to pt and LVM stating that I have the disc with me and we'llhave it for her appt next week

## 2024-01-25 ENCOUNTER — OFFICE VISIT (OUTPATIENT)
Dept: NEUROLOGY | Facility: CLINIC | Age: 79
End: 2024-01-25
Payer: MEDICARE

## 2024-01-25 VITALS — SYSTOLIC BLOOD PRESSURE: 114 MMHG | DIASTOLIC BLOOD PRESSURE: 65 MMHG | HEART RATE: 71 BPM

## 2024-01-25 DIAGNOSIS — N30.00 ACUTE CYSTITIS WITHOUT HEMATURIA: ICD-10-CM

## 2024-01-25 DIAGNOSIS — R40.4 TRANSIENT ALTERATION OF AWARENESS: Primary | ICD-10-CM

## 2024-01-25 DIAGNOSIS — E78.9 LIPID DISORDER: ICD-10-CM

## 2024-01-25 DIAGNOSIS — I10 PRIMARY HYPERTENSION: ICD-10-CM

## 2024-01-25 PROCEDURE — 99204 OFFICE O/P NEW MOD 45 MIN: CPT | Mod: S$PBB,GC,, | Performed by: STUDENT IN AN ORGANIZED HEALTH CARE EDUCATION/TRAINING PROGRAM

## 2024-01-25 PROCEDURE — 99214 OFFICE O/P EST MOD 30 MIN: CPT | Mod: PBBFAC | Performed by: STUDENT IN AN ORGANIZED HEALTH CARE EDUCATION/TRAINING PROGRAM

## 2024-01-25 PROCEDURE — 99999 PR PBB SHADOW E&M-EST. PATIENT-LVL IV: CPT | Mod: PBBFAC,GC,, | Performed by: STUDENT IN AN ORGANIZED HEALTH CARE EDUCATION/TRAINING PROGRAM

## 2024-01-25 NOTE — PROGRESS NOTES
[facility] - NEUROLOGY 7TH [place] [facility], [place]    Date: 1/25/24  Patient Name: Laurie Hicks   MRN: [id number]   PCP: Antonino Mesa  Referring Provider: Antonino Mesa MD    Assessment:   Laurie Hicks is a 78 y.o. female presenting as hospital follow up for episode of transient awareness on 12/25/23. Symptoms included blurry vision, dizzines and emesis. Lasted a couple of minutes and then back to baseline. No focal or unilateral symptoms were reported to suggest a TIA, nor any abnormal movements LOC to suggest seizure. She had extensive workup that includes MRI brain, vessel imaging and EEG that were all unrevealing. She did complete 21 days of DAPT regardless.   Only remarkable lab work as mild UTI and patient is currently on treatment. This can be a trigger for abnormal behavior, transient symptoms and if it had been seizure you would not start AED as you have a trigger and management would be treat that underlying cause.   No indication for patient not be able to drive at the moment. This was all discussed with her in detail and she referred understanding.     Plan:   -- Discussed primary stroke prevention and that we do not suspect this was a TIA nor stroke, specially given imaging.   -- Do not suspect this to be seizure, okay to drive. No indication for AED   -- Continue with primary stroke prevention: hypertension control and anti cholesterol medication.   -- Will upload images to portal.   -- No need for follow up       Problem List Items Addressed This Visit          Cardiac/Vascular    Lipid disorder    Primary hypertension       Renal/    Acute cystitis without hematuria     Other Visit Diagnoses       Transient alteration of awareness    -  Primary            Charline Rapp MD    Patient note was created using MModal Dictation.  Any errors in syntax or even information may not have been identified and edited on initial review prior to  signing this note.  Subjective:   Patient seen in consultation at the request of Antonino Mesa MD for the evaluation of transient awareness. . A copy of this note will be sent to the referring physician.        HPI:   Ms. Laurie Hicks is a 78 y.o. female HTN, HLD presenting as hospital follow up 12/25/23 at [facility] for a constellation of symptoms: vertigo like sensation, abnormal speech, confusion, 1 episode of vomiting and left vision blurriness. She had CT/CTA that was showed possible R M1 stenosis but no occlusion per report and MRI brain with no acute infarct. EEG was also done and no seizure focus. Patient was started on DAPT for 21 days for possible TIA and discharged home. She received macrobid for 3 days. Patient was on ASA prior to this and MV and BP medication.   Other pertinent workup was UA trace leukocytes , 5-10 WBC.    Today:   Patient refers that DOA eyes got blurry and  asked if she was okay and then she does not recall anything else. She mentioned to get the presents and they were already in the car. This episode lasted a couple of minutes and she then threw up and she felt better. They drove 2.5 hours and went to [place] and open present with family and then nephew requested she go to the ED and that is when she got admitted to observation. Patient was at her normal baseline back then and she has been normal ever since. She was seen by neurologist and was evaluated for seizures and stroke and per his note instructions to not drive for 6 months. EEG was done but no report available but there's some R sided slowing?   She was treated for mild UTI and was last seen by her PCP who resumed macrobid for an additional week.     No history of stroke, non smoker. No excessive ETOH.       Hospital discharge summary   Hospital Course: Patient is a 78 y.o. female with history of hypertension who presented for a chief complaint of a brief vertigo like sensation, followed  "by acute vision change, abnormal speech pattern, confusion and 1 episode of vomiting.   Patient is visiting family from out of town for [place]. Endorses dizziness/vertigo since this a.m. and blurriness of her vision left stating less than 1 minute followed by nonsensical speech and then approximately 1/2-hour of confusion. Daughter reports patient was "not acting herself" during this episode. Following this episode, patient had 1 episode of large-volume vomitus. Patient denies any headache during, before or after this event. However, she is unable to recall the details immediately prior to and during this event. She denies any prior history of stroke, seizure, migraine, recent significant changes in medication, illicit drug ingestion, recent falls. Only significant head trauma was during adolescence. [place], patient denies chest pain, shortness of breath, nausea, vomiting, headache, fever, chills, recent illness. Daughter in law at bedside; patient wants to return home; however, discussed that patient should remain in hospital for observation overnight. CT head and CTA head relatively unremarkable. Neurology consulted and recommended patient be admitted to observation. MRI brain was unremarkable. EEG unremarkable. CXR showed an 8mm nodule on right lung. CT chest showed this was a bony island. She is currently back to her baseline. She has been instructed on no driving until cleared by neurologist in [place]. She will continue DAPT x 21 days. Instructed to return to ED with any worsening signs or symptoms   PAST MEDICAL HISTORY:  History reviewed. No pertinent past medical history.    PAST SURGICAL HISTORY:  History reviewed. No pertinent surgical history.    CURRENT MEDS:  Current Outpatient Medications   Medication Sig Dispense Refill    alclomethasone (ACLOVATE) 0.05 % cream Apply topically 2 (two) times daily. X 1-2 wks then prn flares 45 g 1    amLODIPine (NORVASC) 2.5 MG tablet Take 1 tablet (2.5 mg total) " by mouth once daily. For blood pressure control. 90 tablet 2    aspirin (ECOTRIN) 81 MG EC tablet Take 81 mg by mouth once daily.      atorvastatin (LIPITOR) 40 MG tablet Take 1 tablet (40 mg total) by mouth once daily. 90 tablet 3    cloNIDine (CATAPRES) 0.1 MG tablet Take 0.1 mg by mouth 2 (two) times daily as needed. Take if pressure over 175/90      hydrocortisone 2.5 % cream Apply topically 2 (two) times daily. 20 g 1    hydrOXYzine pamoate (VISTARIL) 25 MG Cap Take 1 capsule (25 mg total) by mouth nightly as needed (insomnia). 30 capsule 2    levocetirizine (XYZAL) 5 MG tablet Take 5 mg by mouth every evening.      multivitamin (THERAGRAN) per tablet Take 1 tablet by mouth once daily.      nitrofurantoin, macrocrystal-monohydrate, (MACROBID) 100 MG capsule Take 100 mg by mouth 2 (two) times daily. 3 day supply.      pantoprazole (PROTONIX) 40 MG tablet TAKE 1 TABLET BY MOUTH EVERY NIGHT AT BEDTIME 90 tablet 3    pimecrolimus (ELIDEL) 1 % cream Apply topically 2 (two) times daily. 30 g 3    valsartan (DIOVAN) 80 MG tablet Take 1 tablet (80 mg total) by mouth once daily. 90 tablet 3    clopidogreL (PLAVIX) 75 mg tablet Take 75 mg by mouth once daily. Every morning.      nitrofurantoin, macrocrystal-monohydrate, (MACROBID) 100 MG capsule Take 1 capsule (100 mg total) by mouth 2 (two) times daily. (Patient not taking: Reported on 1/25/2024) 14 capsule 0     No current facility-administered medications for this visit.       ALLERGIES:  Review of patient's allergies indicates:   Allergen Reactions    Pcn [penicillins] Hives    Sulfa (sulfonamide antibiotics) Hives    Benadryl [diphenhydramine hcl] Swelling       FAMILY HISTORY:  Family History   Problem Relation Age of Onset    Melanoma Neg Hx        SOCIAL HISTORY:  Social History     Tobacco Use    Smoking status: Never     Passive exposure: Never    Smokeless tobacco: Never   Substance Use Topics    Alcohol use: Yes     Alcohol/week: 0.8 standard drinks of  alcohol     Types: 1 Standard drinks or equivalent per week     Comment: 1 drink a week.    Drug use: No       Review of Systems:  12 system review of systems is negative except for the symptoms mentioned in HPI.      Objective:     Vitals:    01/25/24 1303   BP: 114/65   Pulse: 71     General: NAD, well nourished   Eyes: no tearing, discharge, no erythema   ENT: moist mucous membranes of the oral cavity, nares patent    Neck: Supple, full range of motion  Cardiovascular: Warm and well perfused, pulses equal and symmetrical  Lungs: Normal work of breathing, normal chest wall excursions  Skin: No rash, lesions, or breakdown on exposed skin  Psychiatry: Mood and affect are appropriate   Abdomen: soft, non tender, non distended  Extremeties: No cyanosis, clubbing or edema.    Neurological   MENTAL STATUS: Alert and oriented to person, place, and time. Attention and concentration within normal limits. Speech without dysarthria, able to name and repeat without difficulty. Recent and remote memory within normal limits   CRANIAL NERVES: Visual fields intact. PERRL. EOMI. Facial sensation intact. Face symmetrical. Hearing grossly intact. Full shoulder shrug bilaterally. Tongue protrudes midline   SENSORY: Sensation is intact to light touch and temperature throughout.  Joint position perception intact. Negative Romberg.   MOTOR: Normal bulk and tone. No pronator drift.  5/5 deltoid, biceps, triceps, interosseous, hand  bilaterally. 5/5 iliopsoas, knee extension/flexion, foot dorsi/plantarflexion bilaterally.    REFLEXES: Symmetric and 2+ throughout. Toes down going bilaterally.   CEREBELLAR/COORDINATION/GAIT: Gait steady with normal arm swing and stride length.  Heel to shin intact. Finger to nose intact. Normal rapid alternating movements.

## 2024-02-05 NOTE — PROGRESS NOTES
Subjective:       Patient ID: Laurie Hicks is a 78 y.o. female.    Chief Complaint: er follow up (Saw dr at [facility] for work up. Was confused for a few minutes and they did w/u for stroke. Put her on clonidine,asa 81mg ,plavix and nitrofurantoin. I faxed record request just now. Dx uti. & told not to drive. )    HPI  The patient presents for follow-up of post ER evaluation altered mental and neurologic status.  Patient presented with a history of mental confusion and nonsensical speech that occurred 6 hours prior to presentation to the ER.  It lasted only a few minutes and went away on its own.  The patient denied experiencing any weakness in her arms or legs.  She denied having any headache or vision changes.  There is no prior history of coronary disease or CVA.  Her neurologic workup was negative including CT, MRI, and EEG.  EEG was consistent with mild encephalopathy.  The patient did have evidence of urinary tract infection.  Macrobid was prescribed for 3 days.  The patient was also sent on dual antiplatelet therapy for a period of 21 days as recommended by neurology consultant.  She was advised not to drive for 6 months or until she was cleared by Neurology back in the [facility].  The patient states she has not experienced any further neurologic symptoms.  She remains on amlodipine for treatment of her hypertension.    Review of Systems   Constitutional:  Negative for activity change, appetite change, fatigue and unexpected weight change.   Eyes:  Negative for visual disturbance.   Respiratory:  Negative for cough and shortness of breath.    Cardiovascular:  Negative for chest pain, palpitations and leg swelling.   Gastrointestinal:  Negative for abdominal pain, blood in stool, constipation and diarrhea.   Genitourinary:  Negative for dysuria and hematuria.   Musculoskeletal:  Negative for arthralgias, neck pain and neck stiffness.   Integumentary:  Negative for rash.   Neurological:   Positive for speech difficulty. Negative for dizziness, syncope and headaches.   Psychiatric/Behavioral:  Positive for confusion. Negative for sleep disturbance.             Physical Exam  Vitals and nursing note reviewed.   Constitutional:       General: She is not in acute distress.     Appearance: Normal appearance. She is well-developed.   HENT:      Head: Normocephalic and atraumatic.   Eyes:      General: No scleral icterus.     Extraocular Movements: Extraocular movements intact.      Conjunctiva/sclera: Conjunctivae normal.   Neck:      Thyroid: No thyromegaly.      Vascular: No JVD.   Cardiovascular:      Rate and Rhythm: Normal rate and regular rhythm.      Heart sounds: Normal heart sounds. No murmur heard.     No friction rub. No gallop.   Pulmonary:      Effort: Pulmonary effort is normal. No respiratory distress.      Breath sounds: Normal breath sounds. No wheezing or rales.   Abdominal:      General: Bowel sounds are normal.      Palpations: Abdomen is soft. There is no mass.      Tenderness: There is no abdominal tenderness.   Musculoskeletal:         General: No tenderness. Normal range of motion.      Cervical back: Normal range of motion and neck supple.      Right lower leg: No edema.      Left lower leg: No edema.   Lymphadenopathy:      Cervical: No cervical adenopathy.   Skin:     General: Skin is warm and dry.      Findings: No rash.   Neurological:      Mental Status: She is alert and oriented to person, place, and time.      Cranial Nerves: No cranial nerve deficit.      Motor: No weakness.      Gait: Gait normal.   Psychiatric:         Mood and Affect: Mood normal.         Behavior: Behavior normal.           No visits with results within 3 Month(s) from this visit.   Latest known visit with results is:   Lab Visit on 04/11/2023   Component Date Value Ref Range Status    Specimen UA 04/11/2023 Urine, Clean Catch   Final    Color, UA 04/11/2023 Yellow  Yellow, Straw, [place] Final     Appearance, UA 04/11/2023 Hazy (A)  Clear Final    pH, UA 04/11/2023 6.0  5.0 - 8.0 Final    Specific Gravity, UA 04/11/2023 1.015  1.005 - 1.030 Final    Protein, UA 04/11/2023 Negative  Negative Final    Comment: Recommend a 24 hour urine protein or a urine   protein/creatinine ratio if globulin induced proteinuria is  clinically suspected.      Glucose, UA 04/11/2023 Negative  Negative Final    Ketones, UA 04/11/2023 Negative  Negative Final    Bilirubin (UA) 04/11/2023 Negative  Negative Final    Occult Blood UA 04/11/2023 1+ (A)  Negative Final    Nitrite, UA 04/11/2023 Negative  Negative Final    Leukocytes, UA 04/11/2023 2+ (A)  Negative Final    RBC, UA 04/11/2023 3  0 - 4 /hpf Final    WBC, UA 04/11/2023 17 (H)  0 - 5 /hpf Final    Bacteria 04/11/2023 Occasional  None-Occ /hpf Final    Squam Epithel, UA 04/11/2023 11  /hpf Final    Non-Squam Epith 04/11/2023 2 (A)  <1/hpf /hpf Final    Microscopic Comment 04/11/2023 SEE COMMENT   Final    Comment: Other formed elements not mentioned in the report are not   present in the microscopic examination.          Assessment & Plan:      Laurie was seen today for er follow up.  The patient was advised to can complete 21 days of dual antiplatelet therapy.  Neurology consultation will be obtained for follow-up of recent TIA symptoms.  Lipitor will be ordered.  Valsartan will be added for better blood pressure control.  Amlodipine will be continued.  Lab studies will be repeated in 3 months.  A blood pressure recheck in 1 month is recommended.    Diagnoses and all orders for this visit:    Transient alteration of awareness  -     Ambulatory referral/consult to Neurology; Future    Primary hypertension    Acute cystitis without hematuria    Insomnia, unspecified type  -     hydrOXYzine pamoate (VISTARIL) 25 MG Cap; Take 1 capsule (25 mg total) by mouth nightly as needed (insomnia).    Other orders  -     valsartan (DIOVAN) 80 MG tablet; Take 1 tablet (80 mg total) by  mouth once daily.  -     nitrofurantoin, macrocrystal-monohydrate, (MACROBID) 100 MG capsule; Take 1 capsule (100 mg total) by mouth 2 (two) times daily. (Patient not taking: Reported on 1/25/2024)  -     atorvastatin (LIPITOR) 40 MG tablet; Take 1 tablet (40 mg total) by mouth once daily.         Follow up in about 4 weeks (around 1/30/2024).     Antonino Mesa MD

## 2024-02-06 ENCOUNTER — CLINICAL SUPPORT (OUTPATIENT)
Dept: INTERNAL MEDICINE | Facility: CLINIC | Age: 79
End: 2024-02-06
Payer: MEDICARE

## 2024-02-06 VITALS — SYSTOLIC BLOOD PRESSURE: 138 MMHG | HEART RATE: 72 BPM | DIASTOLIC BLOOD PRESSURE: 78 MMHG

## 2024-02-06 DIAGNOSIS — I10 PRIMARY HYPERTENSION: Primary | ICD-10-CM

## 2024-02-06 PROCEDURE — 99211 OFF/OP EST MAY X REQ PHY/QHP: CPT | Mod: PBBFAC,PO

## 2024-02-06 PROCEDURE — 99999 PR PBB SHADOW E&M-EST. PATIENT-LVL I: CPT | Mod: PBBFAC,,,

## 2024-02-06 NOTE — PROGRESS NOTES
Bp improved today.  Is on amlodpine and valsartan since 1/2/24 visit.    Has home cuff but not confident in using.  Will bring for next visit in April and I can teach how to use them.  Has been feeling fine.    Has rx at home for clonodine to take if bp elevated , but not checking bp at home.  I made sure she was aware this is not a daily pill, only if bp is elevated when checked.    I gave her print out of April visit to see her next with  for ck up.

## 2024-03-04 ENCOUNTER — LAB VISIT (OUTPATIENT)
Dept: LAB | Facility: HOSPITAL | Age: 79
End: 2024-03-04
Payer: MEDICARE

## 2024-03-04 ENCOUNTER — TELEPHONE (OUTPATIENT)
Dept: INTERNAL MEDICINE | Facility: CLINIC | Age: 79
End: 2024-03-04
Payer: COMMERCIAL

## 2024-03-04 DIAGNOSIS — N39.0 URINARY TRACT INFECTION WITHOUT HEMATURIA, SITE UNSPECIFIED: Primary | ICD-10-CM

## 2024-03-04 DIAGNOSIS — N39.0 URINARY TRACT INFECTION WITHOUT HEMATURIA, SITE UNSPECIFIED: ICD-10-CM

## 2024-03-04 LAB
BILIRUB UR QL STRIP: NEGATIVE
CLARITY UR REFRACT.AUTO: CLEAR
COLOR UR AUTO: ABNORMAL
GLUCOSE UR QL STRIP: NEGATIVE
HGB UR QL STRIP: NEGATIVE
KETONES UR QL STRIP: NEGATIVE
LEUKOCYTE ESTERASE UR QL STRIP: ABNORMAL
MICROSCOPIC COMMENT: NORMAL
NITRITE UR QL STRIP: NEGATIVE
PH UR STRIP: 6 [PH] (ref 5–8)
PROT UR QL STRIP: NEGATIVE
RBC #/AREA URNS AUTO: 1 /HPF (ref 0–4)
SP GR UR STRIP: 1 (ref 1–1.03)
SQUAMOUS #/AREA URNS AUTO: 4 /HPF
URN SPEC COLLECT METH UR: ABNORMAL
WBC #/AREA URNS AUTO: 3 /HPF (ref 0–5)

## 2024-03-04 PROCEDURE — 87086 URINE CULTURE/COLONY COUNT: CPT | Performed by: INTERNAL MEDICINE

## 2024-03-04 PROCEDURE — 81001 URINALYSIS AUTO W/SCOPE: CPT | Performed by: INTERNAL MEDICINE

## 2024-03-04 RX ORDER — NITROFURANTOIN 25; 75 MG/1; MG/1
100 CAPSULE ORAL 2 TIMES DAILY
Qty: 20 CAPSULE | Refills: 0 | Status: SHIPPED | OUTPATIENT
Start: 2024-03-04 | End: 2024-04-29 | Stop reason: ALTCHOICE

## 2024-03-04 NOTE — TELEPHONE ENCOUNTER
----- Message from Sofi Choi MA sent at 3/4/2024  9:43 AM CST -----  Contact: [phone] pt    ----- Message -----  From: Justyna Turk  Sent: 3/4/2024   9:37 AM CST  To: Moshe SOLIS Staff    [email or web address]     Patient is calling for Medical Advice regarding: pt had a UTI and she thinks she has it again.  Please prescribe her some meds for this.    How long has patient had these symptoms:she tested it 4 times over the weekend    Pharmacy name and phone#:  [id number] [place], [place] - [address] [facility] [address]  [facility] [address]  [facility] [address]  Phone: [phone] Fax: [phone]       Would like response via uBankhart:  callback    Comments:

## 2024-03-04 NOTE — TELEPHONE ENCOUNTER
I reached patient.  Symptoms: frequent urination and vaginal itching.    Otc testing done.  Showed color of urine only (???)    I set up u/a and culture for today at lab.    Can we order something to [facility]?    Patient advised to start antibiotic after urine dropped  Off at lab and we will call in 3 days with urine results.    Please advise.  Thanks yinka

## 2024-03-05 LAB — BACTERIA UR CULT: NO GROWTH

## 2024-03-07 RX ORDER — FLUCONAZOLE 150 MG/1
150 TABLET ORAL DAILY
Qty: 1 TABLET | Refills: 0 | Status: SHIPPED | OUTPATIENT
Start: 2024-03-07 | End: 2024-03-08

## 2024-03-07 NOTE — TELEPHONE ENCOUNTER
The urine culture was negative.  The patient can discontinue the Macrobid.  A prescription order for Diflucan to cover possible yeast infection will be sent in view of the patient's reported symptoms of vaginal itching.

## 2024-03-08 ENCOUNTER — TELEPHONE (OUTPATIENT)
Dept: INTERNAL MEDICINE | Facility: CLINIC | Age: 79
End: 2024-03-08
Payer: COMMERCIAL

## 2024-03-08 NOTE — TELEPHONE ENCOUNTER
""The urine culture was negative.  The patient can discontinue the Macrobid.  A prescription order for Diflucan to cover possible yeast infection will be sent in view of the patient's reported symptoms of vaginal itching."     Patient given 's comments above.  She is willing to take.  She expressed understanding all.  "

## 2024-03-08 NOTE — TELEPHONE ENCOUNTER
----- Message from Ike Steele sent at 3/8/2024 10:51 AM CST -----  Contact: [phone]  1MEDICALADVICE     Patient is calling for Medical Advice regarding:  pt has a uti and  wants to know should she continue   her rx     How long has patient had these symptoms:    Pharmacy name and phone#:    Would like response via mychart: dee back     Comments:

## 2024-03-25 NOTE — TELEPHONE ENCOUNTER
Care Due:                  Date            Visit Type   Department     Provider  --------------------------------------------------------------------------------                                EP -                              PRIMARY      [facility] INTERNAL  Last Visit: 01-      [facility] ([facility])   MEDICINE       Antoninojoe Mesa                              EP -                              PRIMARY      [facility] INTERNAL  Next Visit: 04-      [facility] ([facility])   MEDICINE       Antonino WILL Mesa                                                            Last  Test          Frequency    Reason                     Performed    Due Date  --------------------------------------------------------------------------------    CMP.........  12 months..  atorvastatin, valsartan..  04- 04-    Lipid Panel.  12 months..  atorvastatin.............  04- 04-    [facility] Embedded Care Due Messages. Reference number: [id number].   3/25/2024 11:51:09 AM CDT

## 2024-03-26 RX ORDER — AMLODIPINE BESYLATE 2.5 MG/1
TABLET ORAL
Qty: 90 TABLET | Refills: 3 | Status: SHIPPED | OUTPATIENT
Start: 2024-03-26 | End: 2024-04-29 | Stop reason: DRUGHIGH

## 2024-03-26 NOTE — TELEPHONE ENCOUNTER
Laurie Hicks  is requesting a refill authorization.  Brief Assessment and Rationale for Refill:  Approve     Medication Therapy Plan:  FLOS      Comments:     Note composed:3:51 AM 03/26/2024

## 2024-04-22 ENCOUNTER — LAB VISIT (OUTPATIENT)
Dept: LAB | Facility: HOSPITAL | Age: 79
End: 2024-04-22
Payer: MEDICARE

## 2024-04-22 DIAGNOSIS — R79.9 ABNORMAL FINDING OF BLOOD CHEMISTRY, UNSPECIFIED: ICD-10-CM

## 2024-04-22 DIAGNOSIS — I10 PRIMARY HYPERTENSION: ICD-10-CM

## 2024-04-22 LAB
ALBUMIN SERPL BCP-MCNC: 3.8 G/DL (ref 3.5–5.2)
ALP SERPL-CCNC: 90 U/L (ref 55–135)
ALT SERPL W/O P-5'-P-CCNC: 23 U/L (ref 10–44)
ANION GAP SERPL CALC-SCNC: 10 MMOL/L (ref 8–16)
AST SERPL-CCNC: 28 U/L (ref 10–40)
BACTERIA #/AREA URNS AUTO: ABNORMAL /HPF
BASOPHILS # BLD AUTO: 0.08 K/UL (ref 0–0.2)
BASOPHILS NFR BLD: 1.2 % (ref 0–1.9)
BILIRUB SERPL-MCNC: 1.1 MG/DL (ref 0.1–1)
BILIRUB UR QL STRIP: NEGATIVE
BUN SERPL-MCNC: 14 MG/DL (ref 8–23)
CALCIUM SERPL-MCNC: 10.2 MG/DL (ref 8.7–10.5)
CHLORIDE SERPL-SCNC: 103 MMOL/L (ref 95–110)
CHOLEST SERPL-MCNC: 132 MG/DL (ref 120–199)
CHOLEST/HDLC SERPL: 2 {RATIO} (ref 2–5)
CLARITY UR REFRACT.AUTO: ABNORMAL
CO2 SERPL-SCNC: 24 MMOL/L (ref 23–29)
COLOR UR AUTO: YELLOW
CREAT SERPL-MCNC: 0.8 MG/DL (ref 0.5–1.4)
DIFFERENTIAL METHOD BLD: ABNORMAL
EOSINOPHIL # BLD AUTO: 0.2 K/UL (ref 0–0.5)
EOSINOPHIL NFR BLD: 2.6 % (ref 0–8)
ERYTHROCYTE [DISTWIDTH] IN BLOOD BY AUTOMATED COUNT: 13.2 % (ref 11.5–14.5)
EST. GFR  (NO RACE VARIABLE): >60 ML/MIN/1.73 M^2
ESTIMATED AVG GLUCOSE: 105 MG/DL (ref 68–131)
GLUCOSE SERPL-MCNC: 82 MG/DL (ref 70–110)
GLUCOSE UR QL STRIP: NEGATIVE
HBA1C MFR BLD: 5.3 % (ref 4–5.6)
HCT VFR BLD AUTO: 47.2 % (ref 37–48.5)
HDLC SERPL-MCNC: 67 MG/DL (ref 40–75)
HDLC SERPL: 50.8 % (ref 20–50)
HGB BLD-MCNC: 15.2 G/DL (ref 12–16)
HGB UR QL STRIP: NEGATIVE
IMM GRANULOCYTES # BLD AUTO: 0.03 K/UL (ref 0–0.04)
IMM GRANULOCYTES NFR BLD AUTO: 0.5 % (ref 0–0.5)
KETONES UR QL STRIP: NEGATIVE
LDLC SERPL CALC-MCNC: 48.2 MG/DL (ref 63–159)
LEUKOCYTE ESTERASE UR QL STRIP: ABNORMAL
LYMPHOCYTES # BLD AUTO: 1.7 K/UL (ref 1–4.8)
LYMPHOCYTES NFR BLD: 26.3 % (ref 18–48)
MCH RBC QN AUTO: 31.1 PG (ref 27–31)
MCHC RBC AUTO-ENTMCNC: 32.2 G/DL (ref 32–36)
MCV RBC AUTO: 97 FL (ref 82–98)
MICROSCOPIC COMMENT: ABNORMAL
MONOCYTES # BLD AUTO: 0.6 K/UL (ref 0.3–1)
MONOCYTES NFR BLD: 8.7 % (ref 4–15)
NEUTROPHILS # BLD AUTO: 3.9 K/UL (ref 1.8–7.7)
NEUTROPHILS NFR BLD: 60.7 % (ref 38–73)
NITRITE UR QL STRIP: NEGATIVE
NON-SQ EPI CELLS #/AREA URNS AUTO: 2 /HPF
NONHDLC SERPL-MCNC: 65 MG/DL
NRBC BLD-RTO: 0 /100 WBC
PH UR STRIP: 5 [PH] (ref 5–8)
PLATELET # BLD AUTO: 328 K/UL (ref 150–450)
PMV BLD AUTO: 10.9 FL (ref 9.2–12.9)
POTASSIUM SERPL-SCNC: 4.4 MMOL/L (ref 3.5–5.1)
PROT SERPL-MCNC: 7.1 G/DL (ref 6–8.4)
PROT UR QL STRIP: NEGATIVE
RBC # BLD AUTO: 4.88 M/UL (ref 4–5.4)
RBC #/AREA URNS AUTO: 3 /HPF (ref 0–4)
SODIUM SERPL-SCNC: 137 MMOL/L (ref 136–145)
SP GR UR STRIP: 1.02 (ref 1–1.03)
SQUAMOUS #/AREA URNS AUTO: 13 /HPF
T4 FREE SERPL-MCNC: 1.04 NG/DL (ref 0.71–1.51)
TRIGL SERPL-MCNC: 84 MG/DL (ref 30–150)
TSH SERPL DL<=0.005 MIU/L-ACNC: 0.64 UIU/ML (ref 0.4–4)
URN SPEC COLLECT METH UR: ABNORMAL
WBC # BLD AUTO: 6.43 K/UL (ref 3.9–12.7)
WBC #/AREA URNS AUTO: 17 /HPF (ref 0–5)

## 2024-04-22 PROCEDURE — 36415 COLL VENOUS BLD VENIPUNCTURE: CPT | Mod: PO | Performed by: INTERNAL MEDICINE

## 2024-04-22 PROCEDURE — 83036 HEMOGLOBIN GLYCOSYLATED A1C: CPT | Performed by: INTERNAL MEDICINE

## 2024-04-22 PROCEDURE — 85025 COMPLETE CBC W/AUTO DIFF WBC: CPT | Performed by: INTERNAL MEDICINE

## 2024-04-22 PROCEDURE — 84439 ASSAY OF FREE THYROXINE: CPT | Performed by: INTERNAL MEDICINE

## 2024-04-22 PROCEDURE — 80053 COMPREHEN METABOLIC PANEL: CPT | Performed by: INTERNAL MEDICINE

## 2024-04-22 PROCEDURE — 80061 LIPID PANEL: CPT | Performed by: INTERNAL MEDICINE

## 2024-04-22 PROCEDURE — 84443 ASSAY THYROID STIM HORMONE: CPT | Performed by: INTERNAL MEDICINE

## 2024-04-22 PROCEDURE — 81001 URINALYSIS AUTO W/SCOPE: CPT | Performed by: INTERNAL MEDICINE

## 2024-04-25 ENCOUNTER — PATIENT MESSAGE (OUTPATIENT)
Dept: ADMINISTRATIVE | Facility: HOSPITAL | Age: 79
End: 2024-04-25
Payer: COMMERCIAL

## 2024-04-25 ENCOUNTER — PATIENT OUTREACH (OUTPATIENT)
Dept: ADMINISTRATIVE | Facility: HOSPITAL | Age: 79
End: 2024-04-25
Payer: COMMERCIAL

## 2024-04-29 ENCOUNTER — HOSPITAL ENCOUNTER (OUTPATIENT)
Dept: RADIOLOGY | Facility: HOSPITAL | Age: 79
Discharge: HOME | End: 2024-04-29
Attending: INTERNAL MEDICINE
Payer: MEDICARE

## 2024-04-29 ENCOUNTER — OFFICE VISIT (OUTPATIENT)
Dept: INTERNAL MEDICINE | Facility: CLINIC | Age: 79
End: 2024-04-29
Payer: MEDICARE

## 2024-04-29 VITALS
WEIGHT: 136.69 LBS | BODY MASS INDEX: 25.81 KG/M2 | HEIGHT: 61 IN | SYSTOLIC BLOOD PRESSURE: 112 MMHG | TEMPERATURE: 97 F | RESPIRATION RATE: 16 BRPM | DIASTOLIC BLOOD PRESSURE: 78 MMHG | HEART RATE: 68 BPM

## 2024-04-29 DIAGNOSIS — I10 PRIMARY HYPERTENSION: Primary | ICD-10-CM

## 2024-04-29 DIAGNOSIS — J31.0 CHRONIC RHINITIS: ICD-10-CM

## 2024-04-29 DIAGNOSIS — M25.561 ACUTE PAIN OF RIGHT KNEE: ICD-10-CM

## 2024-04-29 PROCEDURE — 99999 PR PBB SHADOW E&M-EST. PATIENT-LVL V: CPT | Mod: PBBFAC,,, | Performed by: INTERNAL MEDICINE

## 2024-04-29 PROCEDURE — 73562 X-RAY EXAM OF KNEE 3: CPT | Mod: TC,PO,RT

## 2024-04-29 PROCEDURE — 73562 X-RAY EXAM OF KNEE 3: CPT | Mod: 26,RT,, | Performed by: RADIOLOGY

## 2024-04-29 PROCEDURE — 99214 OFFICE O/P EST MOD 30 MIN: CPT | Mod: S$PBB,,, | Performed by: INTERNAL MEDICINE

## 2024-04-29 PROCEDURE — 99215 OFFICE O/P EST HI 40 MIN: CPT | Mod: PBBFAC,25,PO | Performed by: INTERNAL MEDICINE

## 2024-04-29 RX ORDER — FLUTICASONE PROPIONATE 50 MCG
2 SPRAY, SUSPENSION (ML) NASAL DAILY
Qty: 16 G | Refills: 3 | Status: SHIPPED | OUTPATIENT
Start: 2024-04-29

## 2024-04-29 RX ORDER — AMLODIPINE BESYLATE 5 MG/1
1 TABLET ORAL EVERY MORNING
COMMUNITY
End: 2024-05-03

## 2024-04-30 ENCOUNTER — TELEPHONE (OUTPATIENT)
Dept: INTERNAL MEDICINE | Facility: CLINIC | Age: 79
End: 2024-04-30
Payer: COMMERCIAL

## 2024-04-30 ENCOUNTER — OFFICE VISIT (OUTPATIENT)
Dept: SPORTS MEDICINE | Facility: CLINIC | Age: 79
End: 2024-04-30
Payer: MEDICARE

## 2024-04-30 ENCOUNTER — HOSPITAL ENCOUNTER (OUTPATIENT)
Dept: RADIOLOGY | Facility: HOSPITAL | Age: 79
Discharge: HOME | End: 2024-04-30
Attending: ORTHOPAEDIC SURGERY
Payer: COMMERCIAL

## 2024-04-30 VITALS
SYSTOLIC BLOOD PRESSURE: 126 MMHG | HEIGHT: 61 IN | HEART RATE: 69 BPM | DIASTOLIC BLOOD PRESSURE: 73 MMHG | WEIGHT: 136.69 LBS | BODY MASS INDEX: 25.81 KG/M2

## 2024-04-30 DIAGNOSIS — M17.11 PRIMARY OSTEOARTHRITIS OF RIGHT KNEE: Primary | ICD-10-CM

## 2024-04-30 DIAGNOSIS — M25.561 ACUTE PAIN OF RIGHT KNEE: ICD-10-CM

## 2024-04-30 PROCEDURE — 73564 X-RAY EXAM KNEE 4 OR MORE: CPT | Mod: 26,,, | Performed by: RADIOLOGY

## 2024-04-30 PROCEDURE — 99204 OFFICE O/P NEW MOD 45 MIN: CPT | Mod: S$PBB,,, | Performed by: ORTHOPAEDIC SURGERY

## 2024-04-30 PROCEDURE — 99215 OFFICE O/P EST HI 40 MIN: CPT | Mod: PBBFAC,25 | Performed by: ORTHOPAEDIC SURGERY

## 2024-04-30 PROCEDURE — 73564 X-RAY EXAM KNEE 4 OR MORE: CPT | Mod: TC,50

## 2024-04-30 PROCEDURE — 99999 PR PBB SHADOW E&M-EST. PATIENT-LVL V: CPT | Mod: PBBFAC,,, | Performed by: ORTHOPAEDIC SURGERY

## 2024-04-30 NOTE — TELEPHONE ENCOUNTER
"Lvm for pt to call office back for xray results .    "X-rays of the right knee were positive for arthritic changes in the joint.  No fracture was noted. "  "

## 2024-04-30 NOTE — PROGRESS NOTES
Subjective:     Chief Complaint: Laurie Hicks is a 78 y.o. female who had concerns including Pain of the Right Knee.    HPI    Patient presents to clinic with acute right knee pain x 6 weeks. Patient states the pain began following a monster truck show that involve a great deal of climbing stairs.  She denies any IVANIA or traumatic event.  Pain is localized along the lateral and posterior aspect of the knee in his worse in the mornings as well as going upstairs.  She rates the pain as 2/10.. She has attempted multiple conservative measures that include activity modification, ice & elevation, and oral medications (Tylenol), with no relief.  Is affecting ADLs and limiting desired level of activity. Denies numbness, tingling, radiation, and inability to bear weight. She is here today to discuss treatment options.    No previous surgeries or trauma on bilateral knees      Review of Systems   Constitutional: Negative.   HENT: Negative.     Eyes: Negative.    Cardiovascular: Negative.    Respiratory: Negative.     Endocrine: Negative.    Hematologic/Lymphatic: Negative.    Skin: Negative.    Musculoskeletal:  Positive for arthritis, joint pain, muscle weakness and stiffness. Negative for joint swelling.   Neurological: Negative.    Psychiatric/Behavioral: Negative.     Allergic/Immunologic: Negative.        Pain Related Questions  Over the past 3 days, what was your average pain during activity? (I.e. running, jogging, walking, climbing stairs, getting dressed, ect.): 2  Over the past 3 days, what was your highest pain level?: 3  Over the past 3 days, what was your lowest pain level? : 1    Other  How many nights a week are you awakened by your affected body part?: 0  Was the patient's HEIGHT measured or patient reported?: Patient Reported  Was the patient's WEIGHT measured or patient reported?: Measured    Objective:     General: Laurie is well-developed, well-nourished, appears stated age, in no acute distress,  alert and oriented to time, place and person.     General    Nursing note and vitals reviewed.  Constitutional: She is oriented to person, place, and time. She appears well-developed and well-nourished. No distress.   HENT:   Head: Normocephalic and atraumatic.   Nose: Nose normal.   Eyes: EOM are normal.   Cardiovascular:  Intact distal pulses.            Pulmonary/Chest: Effort normal. No respiratory distress.   Neurological: She is alert and oriented to person, place, and time.   Psychiatric: She has a normal mood and affect. Her behavior is normal. Judgment and thought content normal.           Right Knee Exam     Inspection   Erythema: absent  Scars: absent  Swelling: absent  Effusion: absent  Deformity: absent  Bruising: absent    Tenderness   The patient is tender to palpation of the medial joint line and lateral joint line.    Range of Motion   Extension:  -5   Flexion:  120     Tests   Meniscus   Anmol:  Medial - positive Lateral - positive  Ligament Examination   Lachman: normal (-1 to 2mm)   PCL-Posterior Drawer: normal (0 to 2mm)     MCL - Valgus: normal (0 to 2mm)  LCL - Varus: normal  Patella   Patellar apprehension: negative  Passive Patellar Tilt: neutral  Patellar Tracking: normal  Patellar Grind: positive    Other   Muscle Tightness: hamstring tightness  Sensation: normal    Left Knee Exam     Inspection   Erythema: absent  Scars: absent  Swelling: absent  Effusion: absent  Deformity: absent  Bruising: absent    Tenderness   The patient is experiencing no tenderness.     Range of Motion   Extension:  -5   Flexion:  120     Tests   Meniscus   Anmol:  Medial - negative Lateral - negative  Stability   Lachman: normal (-1 to 2mm)   PCL-Posterior Drawer: normal (0 to 2mm)  MCL - Valgus: normal (0 to 2mm)  LCL - Varus: normal (0 to 2mm)  Patella   Patellar apprehension: negative  Passive Patellar Tilt: neutral  Patellar Tracking: normal  Patellar Grind: negative    Other   Sensation:  normal    Muscle Strength   Right Lower Extremity   Quadriceps:  5/5   Hamstrin/5   Left Lower Extremity   Quadriceps:  5/5   Hamstrin/5     Vascular Exam     Right Pulses  Dorsalis Pedis:      2+  Posterior Tibial:      2+        Left Pulses  Dorsalis Pedis:      2+  Posterior Tibial:      2+        Edema  Right Lower Leg: absent  Left Lower Leg: absent    Radiographs bilateral knee     My interpretation:    Significant joint space narrowing seen in all 3 compartments with osteophyte formation     Kellgren Santnio grade 4      Assessment:     Encounter Diagnoses   Name Primary?    Acute pain of right knee     Primary osteoarthritis of right knee Yes        Plan:       1. I made the decision to order new imaging of the extremity or extremities evaluated. I independently reviewed and interpreted the radiographs and/or MRIs today. These images were shown to the patient where I then discussed my findings in detail.    2. We have discussed a variety of treatment options including medications, injections, physical therapy and other alternative treatments. I also explained the indications, risks and benefits of surgery. Patient's pain is refractory HEP, conservative management, and NSAIDs. Had a lengthy discussion about osteoarthritis in the knees to include the primary causes to include excessive weight, trauma, genetics, and muscle weakness as well as the different forms of treatment used to help alleviate symptoms including CSI, physical therapy, and Visco supplementation injections.  At this time, Pt would like to proceed with  physical therapy at "[facility] in [place], [place] .    3. Ice compress to the affected area 2-3x a day for 15-20 minutes as needed for pain management.  Anti-inflammatories prn pain.    4. Ambulatory referral to [facility] physical therapy for patellofemoral and quad strength and conditioning protocol.     5. RTC to see Aaron Woodard PA-C in 6-8 weeks for follow-up.  Will reassess  knee pain and determine if patient could benefit from CSI at that time.    All of the patient's questions were answered and the patient will contact us if they have any questions or concerns in the interim.        Patient questionnaires may have been collected.

## 2024-04-30 NOTE — TELEPHONE ENCOUNTER
""X-rays of the right knee were positive for arthritic changes in the joint. No fracture was noted. "    I told her results.  She is in route to orthopedic dr.      "

## 2024-04-30 NOTE — TELEPHONE ENCOUNTER
----- Message from Antonino Mesa MD sent at 4/29/2024 11:41 PM CDT -----  X-rays of the right knee were positive for arthritic changes in the joint.  No fracture was noted.

## 2024-04-30 NOTE — PROGRESS NOTES
Subjective:       Patient ID: Laurie Hicks is a 78 y.o. female.    Chief Complaint: Annual Exam (With labs to revu. Right leg pain , hurts to stand.  Can't do steps. Wants pt order if ok,  needs refills. ), Hypertension, Knee Pain, and chronic rhinitis    HPI  The patient presents for follow-up of medical conditions.  Active medical conditions include hypertension, chronic rhinitis, and right knee pain.  The patient states she felt acute pain in her right knee 6 weeks ago while she was climbing stairs in a stadium.  She did not fall but she had difficulty finishing her climb.  Intermittent swelling has been noted.  She states she is unable to climb steps.  She did apply ice which helped temporarily.  She is using a pull-on knee support.    The patient has been noting chronic rhinitis with postnasal drainage.  It has caused a dry cough.  She is currently using Zyrtec.  She does report increased sensitivity to oak tree pollen.  She has not using nasal inhaler.    The patient has hypertension.  She does not monitor blood pressures but she is tolerating her medication without side effects.    Review of Systems   Constitutional:  Negative for activity change, appetite change, fatigue and unexpected weight change.   HENT:  Positive for postnasal drip.    Eyes:  Negative for visual disturbance.   Respiratory:  Positive for cough. Negative for shortness of breath.    Cardiovascular:  Negative for chest pain, palpitations and leg swelling.   Gastrointestinal:  Negative for abdominal pain, blood in stool, constipation and diarrhea.   Genitourinary:  Negative for dysuria and hematuria.   Musculoskeletal:  Positive for arthralgias. Negative for neck pain and neck stiffness.   Integumentary:  Negative for rash.   Neurological:  Negative for dizziness, syncope and headaches.   Psychiatric/Behavioral:  Negative for sleep disturbance.             Physical Exam  Vitals and nursing note reviewed.   Constitutional:        General: She is not in acute distress.     Appearance: Normal appearance. She is well-developed.   HENT:      Head: Normocephalic and atraumatic.   Eyes:      General: No scleral icterus.     Extraocular Movements: Extraocular movements intact.      Conjunctiva/sclera: Conjunctivae normal.   Neck:      Thyroid: No thyromegaly.      Vascular: No JVD.   Cardiovascular:      Rate and Rhythm: Normal rate and regular rhythm.      Heart sounds: Normal heart sounds. No murmur heard.     No friction rub. No gallop.   Pulmonary:      Effort: Pulmonary effort is normal. No respiratory distress.      Breath sounds: Normal breath sounds. No wheezing or rales.   Abdominal:      General: Bowel sounds are normal.      Palpations: Abdomen is soft. There is no mass.      Tenderness: There is no abdominal tenderness.   Musculoskeletal:      Cervical back: Normal range of motion and neck supple.      Right lower leg: No edema.      Left lower leg: No edema.      Comments: Right knee:  Decreased flexion is present on range-of-motion testing.  No effusion is appreciated.  Hypertrophic joint changes are present.  No popliteal tenderness is noted.    Left knee:  Range of motion is intact.  No local tenderness.   Lymphadenopathy:      Cervical: No cervical adenopathy.   Skin:     General: Skin is warm and dry.      Findings: No rash.   Neurological:      Mental Status: She is alert and oriented to person, place, and time.   Psychiatric:         Mood and Affect: Mood normal.         Behavior: Behavior normal.           Lab Visit on 04/22/2024   Component Date Value Ref Range Status    Sodium 04/22/2024 137  136 - 145 mmol/L Final    Potassium 04/22/2024 4.4  3.5 - 5.1 mmol/L Final    Chloride 04/22/2024 103  95 - 110 mmol/L Final    CO2 04/22/2024 24  23 - 29 mmol/L Final    Glucose 04/22/2024 82  70 - 110 mg/dL Final    BUN 04/22/2024 14  8 - 23 mg/dL Final    Creatinine 04/22/2024 0.8  0.5 - 1.4 mg/dL Final    Calcium 04/22/2024 10.2  8.7  - 10.5 mg/dL Final    Total Protein 04/22/2024 7.1  6.0 - 8.4 g/dL Final    Albumin 04/22/2024 3.8  3.5 - 5.2 g/dL Final    Total Bilirubin 04/22/2024 1.1 (H)  0.1 - 1.0 mg/dL Final    Comment: For infants and newborns, interpretation of results should be based  on gestational age, weight and in agreement with clinical  observations.    Premature Infant recommended reference ranges:  Up to 24 hours.............<8.0 mg/dL  Up to 48 hours............<12.0 mg/dL  3-5 days..................<15.0 mg/dL  6-29 days.................<15.0 mg/dL      Alkaline Phosphatase 04/22/2024 90  55 - 135 U/L Final    AST 04/22/2024 28  10 - 40 U/L Final    ALT 04/22/2024 23  10 - 44 U/L Final    eGFR 04/22/2024 >60.0  >60 mL/min/1.73 m^2 Final    Anion Gap 04/22/2024 10  8 - 16 mmol/L Final    Cholesterol 04/22/2024 132  120 - 199 mg/dL Final    Comment: The National Cholesterol Education Program (NCEP) has set the  following guidelines (reference ranges) for Cholesterol:  Optimal.....................<200 mg/dL  Borderline High.............200-239 mg/dL  High........................> or = 240 mg/dL      Triglycerides 04/22/2024 84  30 - 150 mg/dL Final    Comment: The National Cholesterol Education Program (NCEP) has set the  following guidelines (reference values) for triglycerides:  Normal......................<150 mg/dL  Borderline High.............150-199 mg/dL  High........................200-499 mg/dL      HDL 04/22/2024 67  40 - 75 mg/dL Final    Comment: The National Cholesterol Education Program (NCEP) has set the  following guidelines (reference values) for HDL Cholesterol:  Low...............<40 mg/dL  Optimal...........>60 mg/dL      LDL Cholesterol 04/22/2024 48.2 (L)  63.0 - 159.0 mg/dL Final    Comment: The National Cholesterol Education Program (NCEP) has set the  following guidelines (reference values) for LDL Cholesterol:  Optimal.......................<130 mg/dL  Borderline High...............130-159  mg/dL  High..........................160-189 mg/dL  Very High.....................>190 mg/dL      HDL/Cholesterol Ratio 04/22/2024 50.8 (H)  20.0 - 50.0 % Final    Total Cholesterol/HDL Ratio 04/22/2024 2.0  2.0 - 5.0 Final    Non-HDL Cholesterol 04/22/2024 65  mg/dL Final    Comment: Risk category and Non-HDL cholesterol goals:  Coronary heart disease (CHD)or equivalent (10-year risk of CHD >20%):  Non-HDL cholesterol goal     <130 mg/dL  Two or more CHD risk factors and 10-year risk of CHD <= 20%:  Non-HDL cholesterol goal     <160 mg/dL  0 to 1 CHD risk factor:  Non-HDL cholesterol goal     <190 mg/dL      WBC 04/22/2024 6.43  3.90 - 12.70 K/uL Final    RBC 04/22/2024 4.88  4.00 - 5.40 M/uL Final    Hemoglobin 04/22/2024 15.2  12.0 - 16.0 g/dL Final    Hematocrit 04/22/2024 47.2  37.0 - 48.5 % Final    MCV 04/22/2024 97  82 - 98 fL Final    MCH 04/22/2024 31.1 (H)  27.0 - 31.0 pg Final    MCHC 04/22/2024 32.2  32.0 - 36.0 g/dL Final    RDW 04/22/2024 13.2  11.5 - 14.5 % Final    Platelets 04/22/2024 328  150 - 450 K/uL Final    MPV 04/22/2024 10.9  9.2 - 12.9 fL Final    Immature Granulocytes 04/22/2024 0.5  0.0 - 0.5 % Final    Gran # (ANC) 04/22/2024 3.9  1.8 - 7.7 K/uL Final    Immature Grans (Abs) 04/22/2024 0.03  0.00 - 0.04 K/uL Final    Comment: Mild elevation in immature granulocytes is non specific and   can be seen in a variety of conditions including stress response,   acute inflammation, trauma and pregnancy. Correlation with other   laboratory and clinical findings is essential.      Lymph # 04/22/2024 1.7  1.0 - 4.8 K/uL Final    Mono # 04/22/2024 0.6  0.3 - 1.0 K/uL Final    Eos # 04/22/2024 0.2  0.0 - 0.5 K/uL Final    Baso # 04/22/2024 0.08  0.00 - 0.20 K/uL Final    nRBC 04/22/2024 0  0 /100 WBC Final    Gran % 04/22/2024 60.7  38.0 - 73.0 % Final    Lymph % 04/22/2024 26.3  18.0 - 48.0 % Final    Mono % 04/22/2024 8.7  4.0 - 15.0 % Final    Eosinophil % 04/22/2024 2.6  0.0 - 8.0 % Final     Basophil % 04/22/2024 1.2  0.0 - 1.9 % Final    Differential Method 04/22/2024 Automated   Final    Free T4 04/22/2024 1.04  0.71 - 1.51 ng/dL Final    TSH 04/22/2024 0.640  0.400 - 4.000 uIU/mL Final    Hemoglobin A1C 04/22/2024 5.3  4.0 - 5.6 % Final    Comment: ADA Screening Guidelines:  5.7-6.4%  Consistent with prediabetes  >or=6.5%  Consistent with diabetes    High levels of fetal hemoglobin interfere with the HbA1C  assay. Heterozygous hemoglobin variants (HbS, HgC, etc)do  not significantly interfere with this assay.   However, presence of multiple variants may affect accuracy.      Estimated Avg Glucose 04/22/2024 105  68 - 131 mg/dL Final    Specimen UA 04/22/2024 Urine, Clean Catch   Final    Color, UA 04/22/2024 Yellow  Yellow, Straw, Ludmila Final    Appearance, UA 04/22/2024 Hazy (A)  Clear Final    pH, UA 04/22/2024 5.0  5.0 - 8.0 Final    Specific Gravity, UA 04/22/2024 1.020  1.005 - 1.030 Final    Protein, UA 04/22/2024 Negative  Negative Final    Comment: Recommend a 24 hour urine protein or a urine   protein/creatinine ratio if globulin induced proteinuria is  clinically suspected.      Glucose, UA 04/22/2024 Negative  Negative Final    Ketones, UA 04/22/2024 Negative  Negative Final    Bilirubin (UA) 04/22/2024 Negative  Negative Final    Occult Blood UA 04/22/2024 Negative  Negative Final    Nitrite, UA 04/22/2024 Negative  Negative Final    Leukocytes, UA 04/22/2024 2+ (A)  Negative Final    RBC, UA 04/22/2024 3  0 - 4 /hpf Final    WBC, UA 04/22/2024 17 (H)  0 - 5 /hpf Final    Bacteria 04/22/2024 Few (A)  None-Occ /hpf Final    Squam Epithel, UA 04/22/2024 13  /hpf Final    Non-Squam Epith 04/22/2024 2 (A)  <1/hpf /hpf Final    Microscopic Comment 04/22/2024 SEE COMMENT   Final    Comment: Other formed elements not mentioned in the report are not   present in the microscopic examination.      Lab Visit on 03/04/2024   Component Date Value Ref Range Status    Specimen UA 03/04/2024 Urine, Clean  Catch   Final    Color, UA 03/04/2024 Straw  Yellow, Straw, Ludmila Final    Appearance, UA 03/04/2024 Clear  Clear Final    pH, UA 03/04/2024 6.0  5.0 - 8.0 Final    Specific Gravity, UA 03/04/2024 1.005  1.005 - 1.030 Final    Protein, UA 03/04/2024 Negative  Negative Final    Comment: Recommend a 24 hour urine protein or a urine   protein/creatinine ratio if globulin induced proteinuria is  clinically suspected.      Glucose, UA 03/04/2024 Negative  Negative Final    Ketones, UA 03/04/2024 Negative  Negative Final    Bilirubin (UA) 03/04/2024 Negative  Negative Final    Occult Blood UA 03/04/2024 Negative  Negative Final    Nitrite, UA 03/04/2024 Negative  Negative Final    Leukocytes, UA 03/04/2024 1+ (A)  Negative Final    Urine Culture, Routine 03/04/2024 No growth   Final    RBC, UA 03/04/2024 1  0 - 4 /hpf Final    WBC, UA 03/04/2024 3  0 - 5 /hpf Final    Squam Epithel, UA 03/04/2024 4  /hpf Final    Microscopic Comment 03/04/2024 SEE COMMENT   Final    Comment: Other formed elements not mentioned in the report are not   present in the microscopic examination.          Assessment & Plan:      Laurie was seen today for annual exam, hypertension, knee pain and chronic rhinitis.  Flonase will be added for treatment of rhinitis symptoms.  The patient will continue Zyrtec as needed    X-rays of right knee will be obtained.  Orthopedic consultation will obtained further evaluation continuing right knee pain.    At the patient's request a referral for physical therapy evaluation will be sent.  The patient is requesting evaluation by the [facility] physical therapy group.    Diagnoses and all orders for this visit:    Primary hypertension  -     Comprehensive Metabolic Panel; Future  -     CBC Auto Differential; Future  -     Lipid Panel; Future    Chronic rhinitis    Acute pain of right knee  -     Cancel: X-Ray Knee Complete 4 Or More Views Right; Future  -     Ambulatory referral/consult to Orthopedics; Future  -      Ambulatory referral/consult to Physical/Occupational Therapy; Future    Other orders  -     fluticasone propionate (FLONASE) 50 mcg/actuation nasal spray; 2 sprays (100 mcg total) by Each Nostril route once daily.         Follow up in about 6 months (around 10/29/2024).     Antonino Mesa MD

## 2024-04-30 NOTE — TELEPHONE ENCOUNTER
----- Message from Sofi Choi MA sent at 4/30/2024 10:07 AM CDT -----  Contact: [phone]@[email or web address]    ----- Message -----  From: Whitney Levine  Sent: 4/30/2024   9:55 AM CDT  To: Moshe Antonino A Staff    Patient is returning a phone call.yes   Who left a message for the patient: Linnette Denis MA  Does patient know what this is regarding:    Would you like a call back, or a response through your [facility] portal?:   call back   Comments:

## 2024-05-02 DIAGNOSIS — I10 PRIMARY HYPERTENSION: Primary | ICD-10-CM

## 2024-05-02 NOTE — TELEPHONE ENCOUNTER
----- Message from Sofi Choi MA sent at 5/2/2024  1:46 PM CDT -----  Contact: [phone]    ----- Message -----  From: Harleen Weathers  Sent: 5/2/2024   1:02 PM CDT  To: Moshe Muñiz A Staff    Requesting an RX refill or new RX.  Is this a refill or new RX: new prescription   RX name and strength (copy/paste from chart):  amLODIPine (NORVASC) 5 MG tablet  Is this a 30 day or 90 day RX: 90  Pharmacy name and phone # (copy/paste from chart):    [id number] [place] [address] [address] [facility] [address]  [facility] [address]  [facility] [address]  Phone: [phone] Fax: [phone]     The doctors have asked that we provide their patients with the following 2 reminders -- prescription refills can take up to 72 hours, and a friendly reminder that in the future you can use your [facility] account to request refills: yes

## 2024-05-02 NOTE — TELEPHONE ENCOUNTER
4/29 visit , says change how you are taking amlodpine.  Medlist says stop 4/29/24 amlodipine 2.5mg    Am I understanding correctly?    She is calling for refill and msg says 5mg in error.  I spoke to her and she doesn't remember  what she  Is suppose to be on.      I need to call her back with instructions.  Getting low on amlodipine 2.5    Please advise. Thanks yinka

## 2024-05-03 RX ORDER — AMLODIPINE BESYLATE 2.5 MG/1
2.5 TABLET ORAL DAILY
Qty: 90 TABLET | Refills: 3 | Status: SHIPPED | OUTPATIENT
Start: 2024-05-03 | End: 2025-05-03

## 2024-05-03 NOTE — TELEPHONE ENCOUNTER
No care due was identified.  [facility] Embedded Care Due Messages. Reference number: [id number].   5/03/2024 3:34:38 PM CDT

## 2024-05-03 NOTE — TELEPHONE ENCOUNTER
If the patient has been taking amlodipine 2.5 mg daily she should continued at the same dosage.  If she was taking 2 tablets of the 2.5 mg daily we can switch to amlodipine 5 mg once a day.

## 2024-05-03 NOTE — TELEPHONE ENCOUNTER
I called and she is only 1  daily  2.5mg pill.  I called rx [facility].  Marked rx as phoned in.    Just need you to sign to chart as phoned in.    Thanks yinka

## 2024-06-18 ENCOUNTER — OFFICE VISIT (OUTPATIENT)
Dept: SPORTS MEDICINE | Facility: CLINIC | Age: 79
End: 2024-06-18
Payer: MEDICARE

## 2024-06-18 VITALS
SYSTOLIC BLOOD PRESSURE: 132 MMHG | DIASTOLIC BLOOD PRESSURE: 71 MMHG | HEART RATE: 77 BPM | WEIGHT: 140.44 LBS | HEIGHT: 61 IN | BODY MASS INDEX: 26.51 KG/M2

## 2024-06-18 DIAGNOSIS — M25.561 ACUTE PAIN OF RIGHT KNEE: ICD-10-CM

## 2024-06-18 DIAGNOSIS — M17.11 PRIMARY OSTEOARTHRITIS OF RIGHT KNEE: Primary | ICD-10-CM

## 2024-06-18 PROCEDURE — 99213 OFFICE O/P EST LOW 20 MIN: CPT | Mod: PBBFAC | Performed by: ORTHOPAEDIC SURGERY

## 2024-06-18 PROCEDURE — 99999 PR PBB SHADOW E&M-EST. PATIENT-LVL III: CPT | Mod: PBBFAC,,, | Performed by: ORTHOPAEDIC SURGERY

## 2024-06-18 PROCEDURE — 99213 OFFICE O/P EST LOW 20 MIN: CPT | Mod: S$PBB,,, | Performed by: ORTHOPAEDIC SURGERY

## 2024-06-18 NOTE — PROGRESS NOTES
Subjective:     Chief Complaint: Laurie Hicks is a 78 y.o. female who had concerns including Follow-up of the Right Knee.    HPI    Patient presents today for follow-up of acute right knee pain.  Patient has been attending PT at [facility] physical therapy for the past 6 weeks.  She reports tremendous progress and feels her symptoms have decreased significantly.  She can now go upstairs with little pain.  When present, pain is still localized along the lateral and posterior aspect of the knee.  Overall, she is very satisfied with the progress she is made.  She rates the pain as 5/10 today.    Interval history 04/30/2024:  Patient presents to clinic with acute right knee pain x 6 weeks. Patient states the pain began following a monster truck show that involve a great deal of climbing stairs.  She denies any IVANIA or traumatic event.  Pain is localized along the lateral and posterior aspect of the knee in his worse in the mornings as well as going upstairs.  She rates the pain as 2/10.. She has attempted multiple conservative measures that include activity modification, ice & elevation, and oral medications (Tylenol), with no relief.  Is affecting ADLs and limiting desired level of activity. Denies numbness, tingling, radiation, and inability to bear weight. She is here today to discuss treatment options.    No previous surgeries or trauma on bilateral knees      Review of Systems   Constitutional: Negative.   HENT: Negative.     Eyes: Negative.    Cardiovascular: Negative.    Respiratory: Negative.     Endocrine: Negative.    Hematologic/Lymphatic: Negative.    Skin: Negative.    Musculoskeletal:  Positive for arthritis, joint pain, muscle weakness and stiffness. Negative for joint swelling.   Neurological: Negative.    Psychiatric/Behavioral: Negative.     Allergic/Immunologic: Negative.        Pain Related Questions  Over the past 3 days, what was your average pain during activity? (I.e. running, jogging,  walking, climbing stairs, getting dressed, ect.): 8  Over the past 3 days, what was your highest pain level?: 5  Over the past 3 days, what was your lowest pain level? : 3    Other  How many nights a week are you awakened by your affected body part?: 3  Was the patient's HEIGHT measured or patient reported?: Patient Reported  Was the patient's WEIGHT measured or patient reported?: Measured    Objective:     General: Laurie is well-developed, well-nourished, appears stated age, in no acute distress, alert and oriented to time, place and person.     General    Nursing note and vitals reviewed.  Constitutional: She is oriented to person, place, and time. She appears well-developed and well-nourished. No distress.   HENT:   Head: Normocephalic and atraumatic.   Nose: Nose normal.   Eyes: EOM are normal.   Cardiovascular:  Intact distal pulses.            Pulmonary/Chest: Effort normal. No respiratory distress.   Neurological: She is alert and oriented to person, place, and time.   Psychiatric: She has a normal mood and affect. Her behavior is normal. Judgment and thought content normal.           Right Knee Exam     Inspection   Erythema: absent  Scars: absent  Swelling: absent  Effusion: absent  Deformity: absent  Bruising: absent    Tenderness   The patient is tender to palpation of the medial joint line and lateral joint line.    Range of Motion   Extension:  -5   Flexion:  120     Tests   Meniscus   Anmol:  Medial - positive Lateral - positive  Ligament Examination   Lachman: normal (-1 to 2mm)   PCL-Posterior Drawer: normal (0 to 2mm)     MCL - Valgus: normal (0 to 2mm)  LCL - Varus: normal  Patella   Patellar apprehension: negative  Passive Patellar Tilt: neutral  Patellar Tracking: normal  Patellar Grind: positive    Other   Muscle Tightness: hamstring tightness  Sensation: normal    Left Knee Exam     Inspection   Erythema: absent  Scars: absent  Swelling: absent  Effusion: absent  Deformity:  absent  Bruising: absent    Tenderness   The patient is experiencing no tenderness.     Range of Motion   Extension:  -5   Flexion:  120     Tests   Meniscus   Anmol:  Medial - negative Lateral - negative  Stability   Lachman: normal (-1 to 2mm)   PCL-Posterior Drawer: normal (0 to 2mm)  MCL - Valgus: normal (0 to 2mm)  LCL - Varus: normal (0 to 2mm)  Patella   Patellar apprehension: negative  Passive Patellar Tilt: neutral  Patellar Tracking: normal  Patellar Grind: negative    Other   Sensation: normal    Muscle Strength   Right Lower Extremity   Quadriceps:  5/5   Hamstrin/5   Left Lower Extremity   Quadriceps:  5/5   Hamstrin/5     Vascular Exam     Right Pulses  Dorsalis Pedis:      2+  Posterior Tibial:      2+        Left Pulses  Dorsalis Pedis:      2+  Posterior Tibial:      2+        Edema  Right Lower Leg: absent  Left Lower Leg: absent    Radiographs bilateral knee     My interpretation:    Significant joint space narrowing seen in all 3 compartments with osteophyte formation     Kellgren Santino grade 4      Assessment:     Encounter Diagnoses   Name Primary?    Acute pain of right knee     Primary osteoarthritis of right knee Yes          Plan:       1. We again discussed a variety of treatment options including medications, injections, physical therapy and other alternative treatments. I also explained the indications, risks and benefits of surgery. Patient's pain is refractory HEP, conservative management, and NSAIDs. Had a lengthy discussion about osteoarthritis in the knees to include the primary causes to include excessive weight, trauma, genetics, and muscle weakness as well as the different forms of treatment used to help alleviate symptoms including CSI, physical therapy, and Visco supplementation injections.  At this time, Pt would like to continue with  physical therapy at [facility] in [place], [place] .    2.  Ice compress to the affected area 2-3x a day for 15-20 minutes as  needed for pain management.  Anti-inflammatories prn pain.    3..  Continue [facility] therapy for patellofemoral and quad strength and conditioning protocol.  She may transition to a home exercise program whenever she feels ready.    4. RTC to see Aaron Woodard PA-C in eight weeks for follow-up.  Will reassess knee pain and determine if patient could benefit from CSI at that time.        All of the patient's questions were answered. Patient was advised to call the clinic or contact me through the patient portal for any questions or concerns.       Medical Dictation software was used during the dictation of portions or the entirety of this medical record.  Phonetic or grammatic errors may exist due to the use of this software. For clarification, refer to the author of the document.      Patient questionnaires may have been collected.

## 2024-08-01 RX ORDER — PANTOPRAZOLE SODIUM 40 MG/1
TABLET, DELAYED RELEASE ORAL
Qty: 90 TABLET | Refills: 2 | Status: SHIPPED | OUTPATIENT
Start: 2024-08-01

## 2024-08-01 NOTE — TELEPHONE ENCOUNTER
Refill Decision Note   Laurie Hicks  is requesting a refill authorization.  Brief Assessment and Rationale for Refill:  Approve     Medication Therapy Plan:         Comments:     Note composed:11:28 AM 08/01/2024

## 2024-08-01 NOTE — TELEPHONE ENCOUNTER
No care due was identified.  [facility] Embedded Care Due Messages. Reference number: [id number].   8/01/2024 5:24:45 AM CDT

## 2024-08-20 ENCOUNTER — OFFICE VISIT (OUTPATIENT)
Dept: SPORTS MEDICINE | Facility: CLINIC | Age: 79
End: 2024-08-20
Payer: MEDICARE

## 2024-08-20 VITALS
WEIGHT: 140.44 LBS | DIASTOLIC BLOOD PRESSURE: 69 MMHG | HEIGHT: 61 IN | HEART RATE: 69 BPM | SYSTOLIC BLOOD PRESSURE: 114 MMHG | BODY MASS INDEX: 26.51 KG/M2

## 2024-08-20 DIAGNOSIS — M17.11 PRIMARY OSTEOARTHRITIS OF RIGHT KNEE: Primary | ICD-10-CM

## 2024-08-20 DIAGNOSIS — M25.561 ACUTE PAIN OF RIGHT KNEE: ICD-10-CM

## 2024-08-20 PROCEDURE — 99999 PR PBB SHADOW E&M-EST. PATIENT-LVL IV: CPT | Mod: PBBFAC,,, | Performed by: ORTHOPAEDIC SURGERY

## 2024-08-20 PROCEDURE — 99214 OFFICE O/P EST MOD 30 MIN: CPT | Mod: PBBFAC | Performed by: ORTHOPAEDIC SURGERY

## 2024-08-20 PROCEDURE — 99212 OFFICE O/P EST SF 10 MIN: CPT | Mod: S$PBB,,, | Performed by: ORTHOPAEDIC SURGERY

## 2024-08-20 NOTE — PROGRESS NOTES
Subjective:     Chief Complaint: Laurie Hicks is a 79 y.o. female who had concerns including Pain of the Right Knee.    HPI    Patient presents today for follow-up of acute right knee pain.  Patient has completed physical therapy at [facility].  She reports tremendous progress and feels her symptoms have completely resolved.  She can now go upstairs with little pain. Overall, she is very satisfied with the progress she is made.  She rates the pain as 0/10 today.    Interval history 04/30/2024:  Patient presents to clinic with acute right knee pain x 6 weeks. Patient states the pain began following a monster truck show that involve a great deal of climbing stairs.  She denies any IVANIA or traumatic event.  Pain is localized along the lateral and posterior aspect of the knee in his worse in the mornings as well as going upstairs.  She rates the pain as 2/10.. She has attempted multiple conservative measures that include activity modification, ice & elevation, and oral medications (Tylenol), with no relief.  Is affecting ADLs and limiting desired level of activity. Denies numbness, tingling, radiation, and inability to bear weight. She is here today to discuss treatment options.    No previous surgeries or trauma on bilateral knees      Review of Systems   Constitutional: Negative.   HENT: Negative.     Eyes: Negative.    Cardiovascular: Negative.    Respiratory: Negative.     Endocrine: Negative.    Hematologic/Lymphatic: Negative.    Skin: Negative.    Musculoskeletal:  Positive for arthritis, joint pain, muscle weakness and stiffness. Negative for joint swelling.   Neurological: Negative.    Psychiatric/Behavioral: Negative.     Allergic/Immunologic: Negative.                  Objective:     General: Laurie is well-developed, well-nourished, appears stated age, in no acute distress, alert and oriented to time, place and person.     General    Nursing note and vitals  reviewed.  Constitutional: She is oriented to person, place, and time. She appears well-developed and well-nourished. No distress.   HENT:   Head: Normocephalic and atraumatic.   Nose: Nose normal.   Eyes: EOM are normal.   Cardiovascular:  Intact distal pulses.            Pulmonary/Chest: Effort normal. No respiratory distress.   Neurological: She is alert and oriented to person, place, and time.   Psychiatric: She has a normal mood and affect. Her behavior is normal. Judgment and thought content normal.           Right Knee Exam     Inspection   Erythema: absent  Scars: absent  Swelling: absent  Effusion: absent  Deformity: absent  Bruising: absent    Tenderness   The patient is experiencing no tenderness.     Range of Motion   Extension:  -5   Flexion:  120     Tests   Meniscus   Anmol:  Medial - negative Lateral - negative  Ligament Examination   Lachman: normal (-1 to 2mm)   PCL-Posterior Drawer: normal (0 to 2mm)     MCL - Valgus: normal (0 to 2mm)  LCL - Varus: normal  Patella   Patellar apprehension: negative  Passive Patellar Tilt: neutral  Patellar Tracking: normal  Patellar Grind: negative    Other   Sensation: normal    Left Knee Exam     Inspection   Erythema: absent  Scars: absent  Swelling: absent  Effusion: absent  Deformity: absent  Bruising: absent    Tenderness   The patient is experiencing no tenderness.     Range of Motion   Extension:  -5   Flexion:  120     Tests   Meniscus   Anmol:  Medial - negative Lateral - negative  Stability   Lachman: normal (-1 to 2mm)   PCL-Posterior Drawer: normal (0 to 2mm)  MCL - Valgus: normal (0 to 2mm)  LCL - Varus: normal (0 to 2mm)  Patella   Patellar apprehension: negative  Passive Patellar Tilt: neutral  Patellar Tracking: normal  Patellar Grind: negative    Other   Sensation: normal    Muscle Strength   Right Lower Extremity   Quadriceps:  5/5   Hamstrin/5   Left Lower Extremity   Quadriceps:  5/5   Hamstrin/5     Vascular Exam     Right  Pulses  Dorsalis Pedis:      2+  Posterior Tibial:      2+        Left Pulses  Dorsalis Pedis:      2+  Posterior Tibial:      2+        Edema  Right Lower Leg: absent  Left Lower Leg: absent    Radiographs bilateral knee     My interpretation:    Significant joint space narrowing seen in all 3 compartments with osteophyte formation     Kellgren Santino grade 4      Assessment:     Encounter Diagnoses   Name Primary?    Primary osteoarthritis of right knee Yes    Acute pain of right knee             Plan:       1. We again discussed a variety of treatment options including medications, injections, physical therapy and other alternative treatments. I also explained the indications, risks and benefits of surgery. Patient's pain is refractory HEP, conservative management, and NSAIDs. Had a lengthy discussion about osteoarthritis in the knees to include the primary causes to include excessive weight, trauma, genetics, and muscle weakness as well as the different forms of treatment used to help alleviate symptoms including CSI, physical therapy, and Visco supplementation injections.  At this time, Pt would like to continue her home exercise program.    2. Ice compress to the affected area 2-3x a day for 15-20 minutes as needed for pain management.  Anti-inflammatories prn pain.    3. Home exercise program with patellofemoral and quad strength and conditioning protocol.      4. RTC to see Aaron Woodard PA-C prn.  Patient will let us know if pain returns and she would like to consider CSI or return to formal physical therapy.        All of the patient's questions were answered. Patient was advised to call the clinic or contact me through the patient portal for any questions or concerns.       Medical Dictation software was used during the dictation of portions or the entirety of this medical record.  Phonetic or grammatic errors may exist due to the use of this software. For clarification, refer to the author of  the document.      Patient questionnaires may have been collected.

## 2024-09-25 DIAGNOSIS — Z78.0 MENOPAUSE: ICD-10-CM

## 2024-10-24 ENCOUNTER — LAB VISIT (OUTPATIENT)
Dept: LAB | Facility: HOSPITAL | Age: 79
End: 2024-10-24
Payer: MEDICARE

## 2024-10-24 DIAGNOSIS — I10 PRIMARY HYPERTENSION: ICD-10-CM

## 2024-10-24 LAB
ALBUMIN SERPL BCP-MCNC: 3.7 G/DL (ref 3.5–5.2)
ALP SERPL-CCNC: 101 U/L (ref 40–150)
ALT SERPL W/O P-5'-P-CCNC: 19 U/L (ref 10–44)
ANION GAP SERPL CALC-SCNC: 7 MMOL/L (ref 8–16)
AST SERPL-CCNC: 24 U/L (ref 10–40)
BASOPHILS # BLD AUTO: 0.07 K/UL (ref 0–0.2)
BASOPHILS NFR BLD: 1 % (ref 0–1.9)
BILIRUB SERPL-MCNC: 1.1 MG/DL (ref 0.1–1)
BUN SERPL-MCNC: 15 MG/DL (ref 8–23)
CALCIUM SERPL-MCNC: 10.1 MG/DL (ref 8.7–10.5)
CHLORIDE SERPL-SCNC: 105 MMOL/L (ref 95–110)
CHOLEST SERPL-MCNC: 130 MG/DL (ref 120–199)
CHOLEST/HDLC SERPL: 1.9 {RATIO} (ref 2–5)
CO2 SERPL-SCNC: 27 MMOL/L (ref 23–29)
CREAT SERPL-MCNC: 0.8 MG/DL (ref 0.5–1.4)
DIFFERENTIAL METHOD BLD: ABNORMAL
EOSINOPHIL # BLD AUTO: 0.2 K/UL (ref 0–0.5)
EOSINOPHIL NFR BLD: 3.3 % (ref 0–8)
ERYTHROCYTE [DISTWIDTH] IN BLOOD BY AUTOMATED COUNT: 13.1 % (ref 11.5–14.5)
EST. GFR  (NO RACE VARIABLE): >60 ML/MIN/1.73 M^2
GLUCOSE SERPL-MCNC: 89 MG/DL (ref 70–110)
HCT VFR BLD AUTO: 43.7 % (ref 37–48.5)
HDLC SERPL-MCNC: 68 MG/DL (ref 40–75)
HDLC SERPL: 52.3 % (ref 20–50)
HGB BLD-MCNC: 14.3 G/DL (ref 12–16)
IMM GRANULOCYTES # BLD AUTO: 0.03 K/UL (ref 0–0.04)
IMM GRANULOCYTES NFR BLD AUTO: 0.4 % (ref 0–0.5)
LDLC SERPL CALC-MCNC: 46.6 MG/DL (ref 63–159)
LYMPHOCYTES # BLD AUTO: 2 K/UL (ref 1–4.8)
LYMPHOCYTES NFR BLD: 29 % (ref 18–48)
MCH RBC QN AUTO: 31.2 PG (ref 27–31)
MCHC RBC AUTO-ENTMCNC: 32.7 G/DL (ref 32–36)
MCV RBC AUTO: 95 FL (ref 82–98)
MONOCYTES # BLD AUTO: 0.7 K/UL (ref 0.3–1)
MONOCYTES NFR BLD: 10.6 % (ref 4–15)
NEUTROPHILS # BLD AUTO: 3.7 K/UL (ref 1.8–7.7)
NEUTROPHILS NFR BLD: 55.7 % (ref 38–73)
NONHDLC SERPL-MCNC: 62 MG/DL
NRBC BLD-RTO: 0 /100 WBC
PLATELET # BLD AUTO: 291 K/UL (ref 150–450)
PMV BLD AUTO: 11.1 FL (ref 9.2–12.9)
POTASSIUM SERPL-SCNC: 4.4 MMOL/L (ref 3.5–5.1)
PROT SERPL-MCNC: 6.9 G/DL (ref 6–8.4)
RBC # BLD AUTO: 4.58 M/UL (ref 4–5.4)
SODIUM SERPL-SCNC: 139 MMOL/L (ref 136–145)
TRIGL SERPL-MCNC: 77 MG/DL (ref 30–150)
WBC # BLD AUTO: 6.72 K/UL (ref 3.9–12.7)

## 2024-10-24 PROCEDURE — 80061 LIPID PANEL: CPT | Performed by: INTERNAL MEDICINE

## 2024-10-24 PROCEDURE — 36415 COLL VENOUS BLD VENIPUNCTURE: CPT | Performed by: INTERNAL MEDICINE

## 2024-10-24 PROCEDURE — 80053 COMPREHEN METABOLIC PANEL: CPT | Performed by: INTERNAL MEDICINE

## 2024-10-24 PROCEDURE — 85025 COMPLETE CBC W/AUTO DIFF WBC: CPT | Performed by: INTERNAL MEDICINE

## 2024-10-31 ENCOUNTER — OFFICE VISIT (OUTPATIENT)
Dept: INTERNAL MEDICINE | Facility: CLINIC | Age: 79
End: 2024-10-31
Payer: MEDICARE

## 2024-10-31 VITALS
BODY MASS INDEX: 26.06 KG/M2 | HEIGHT: 61 IN | HEART RATE: 64 BPM | OXYGEN SATURATION: 98 % | RESPIRATION RATE: 16 BRPM | SYSTOLIC BLOOD PRESSURE: 128 MMHG | WEIGHT: 138 LBS | DIASTOLIC BLOOD PRESSURE: 68 MMHG | TEMPERATURE: 97 F

## 2024-10-31 DIAGNOSIS — E78.49 OTHER HYPERLIPIDEMIA: ICD-10-CM

## 2024-10-31 DIAGNOSIS — I10 PRIMARY HYPERTENSION: Primary | ICD-10-CM

## 2024-10-31 DIAGNOSIS — I70.0 AORTIC ATHEROSCLEROSIS: ICD-10-CM

## 2024-10-31 DIAGNOSIS — J31.0 CHRONIC RHINITIS: ICD-10-CM

## 2024-10-31 PROCEDURE — 99999 PR PBB SHADOW E&M-EST. PATIENT-LVL IV: CPT | Mod: PBBFAC,,, | Performed by: INTERNAL MEDICINE

## 2024-10-31 PROCEDURE — 99214 OFFICE O/P EST MOD 30 MIN: CPT | Mod: S$PBB,,, | Performed by: INTERNAL MEDICINE

## 2024-10-31 PROCEDURE — 99214 OFFICE O/P EST MOD 30 MIN: CPT | Mod: PBBFAC,PO | Performed by: INTERNAL MEDICINE

## 2024-10-31 RX ORDER — AZELASTINE 1 MG/ML
2 SPRAY, METERED NASAL 2 TIMES DAILY
Qty: 30 ML | Refills: 11 | Status: SHIPPED | OUTPATIENT
Start: 2024-10-31

## 2024-10-31 NOTE — PROGRESS NOTES
Subjective:       Patient ID: Laurie Hicks is a 79 y.o. female.    Chief Complaint: Hypertension (6 mos wlabs. To revu , she  will see friend of family  belén son)    History of Present Illness    Laurie presents today for follow-up of hypertension, hyperlipidemia, and chronic rhinitis..    WEIGHT MANAGEMENT:  She reports difficulty maintaining weight, noting a 2-pound gain from 136 to 138 lbs since April. She expresses challenges controlling eating habits, particularly in social settings. She attempts to manage weight through regular walks in [place] but finds it challenging to consistently refuse food at social gatherings.    SLEEP:  She sleeps at least 8 hours per night, with a routine of going to bed around 9:00 PM and waking up at 5:00 AM. She takes her medication at 5:00 AM daily, followed by a cup of coffee. She denies regularly napping during the day.    MEDICATIONS:  She is currently taking amlodipine and valsartan for blood pressure management, atorvastatin for cholesterol control, and one regular aspirin daily. For allergy management, she uses Zyrtec daily and Flonase nasal spray.    BLOOD PRESSURE:  She reports home blood pressure readings averaging around 117 systolic, with consistently good diastolic values. She notes improvement in blood pressure control with the additional 5 mg of medication.    LAB RESULTS:  She reports normal lab results across multiple parameters. Blood count, chemistry profile, kidney function tests, glucose, and liver function tests are all within normal limits. Cholesterol panel reveals favorable results with total cholesterol at 130 and LDL cholesterol at 46.    SINUS ISSUES:  She reports constant sinus problems with postnasal drainage affected by weather and pollen. She expresses frustration, indicating she has tried numerous treatments without significant relief. Her nephew, an ENT specialist, has commented that her sinuses are particularly  problematic.    GASTROINTESTINAL:  She reports a past history of heartburn, which was treated with a prescription medication provided by her nephew. The issue resolved after taking the medication once and has not recurred. She denies any current symptoms of heartburn or acid reflux and reports regular bowel movements without issues.    GENITOURINARY:  She denies any bladder control issues and reports normal urinary function.      ROS:  Constitutional: +weight gain  ENT: +nasal congestion  Gastrointestinal: -heartburn  Genitourinary: -urinary incontinence  Psychiatric: -sleep difficulty              Physical Exam  Vitals and nursing note reviewed.   Constitutional:       General: She is not in acute distress.     Appearance: Normal appearance. She is well-developed.   HENT:      Head: Normocephalic and atraumatic.   Eyes:      General: No scleral icterus.     Extraocular Movements: Extraocular movements intact.      Conjunctiva/sclera: Conjunctivae normal.   Neck:      Thyroid: No thyromegaly.      Vascular: No JVD.   Cardiovascular:      Rate and Rhythm: Normal rate and regular rhythm.      Heart sounds: Normal heart sounds. No murmur heard.     No friction rub. No gallop.   Pulmonary:      Effort: Pulmonary effort is normal. No respiratory distress.      Breath sounds: Normal breath sounds. No wheezing or rales.   Abdominal:      General: Bowel sounds are normal.      Palpations: Abdomen is soft. There is no mass.      Tenderness: There is no abdominal tenderness.   Musculoskeletal:         General: No tenderness. Normal range of motion.      Cervical back: Normal range of motion and neck supple.      Right lower leg: No edema.      Left lower leg: No edema.   Lymphadenopathy:      Cervical: No cervical adenopathy.   Skin:     General: Skin is warm and dry.      Findings: No rash.   Neurological:      Mental Status: She is alert and oriented to person, place, and time.   Psychiatric:         Mood and Affect: Mood  normal.         Behavior: Behavior normal.           Lab Visit on 10/24/2024   Component Date Value Ref Range Status    Sodium 10/24/2024 139  136 - 145 mmol/L Final    Potassium 10/24/2024 4.4  3.5 - 5.1 mmol/L Final    Chloride 10/24/2024 105  95 - 110 mmol/L Final    CO2 10/24/2024 27  23 - 29 mmol/L Final    Glucose 10/24/2024 89  70 - 110 mg/dL Final    BUN 10/24/2024 15  8 - 23 mg/dL Final    Creatinine 10/24/2024 0.8  0.5 - 1.4 mg/dL Final    Calcium 10/24/2024 10.1  8.7 - 10.5 mg/dL Final    Total Protein 10/24/2024 6.9  6.0 - 8.4 g/dL Final    Albumin 10/24/2024 3.7  3.5 - 5.2 g/dL Final    Total Bilirubin 10/24/2024 1.1 (H)  0.1 - 1.0 mg/dL Final    Comment: For infants and newborns, interpretation of results should be based  on gestational age, weight and in agreement with clinical  observations.    Premature Infant recommended reference ranges:  Up to 24 hours.............<8.0 mg/dL  Up to 48 hours............<12.0 mg/dL  3-5 days..................<15.0 mg/dL  6-29 days.................<15.0 mg/dL      Alkaline Phosphatase 10/24/2024 101  40 - 150 U/L Final    AST 10/24/2024 24  10 - 40 U/L Final    ALT 10/24/2024 19  10 - 44 U/L Final    eGFR 10/24/2024 >60.0  >60 mL/min/1.73 m^2 Final    Anion Gap 10/24/2024 7 (L)  8 - 16 mmol/L Final    WBC 10/24/2024 6.72  3.90 - 12.70 K/uL Final    RBC 10/24/2024 4.58  4.00 - 5.40 M/uL Final    Hemoglobin 10/24/2024 14.3  12.0 - 16.0 g/dL Final    Hematocrit 10/24/2024 43.7  37.0 - 48.5 % Final    MCV 10/24/2024 95  82 - 98 fL Final    MCH 10/24/2024 31.2 (H)  27.0 - 31.0 pg Final    MCHC 10/24/2024 32.7  32.0 - 36.0 g/dL Final    RDW 10/24/2024 13.1  11.5 - 14.5 % Final    Platelets 10/24/2024 291  150 - 450 K/uL Final    MPV 10/24/2024 11.1  9.2 - 12.9 fL Final    Immature Granulocytes 10/24/2024 0.4  0.0 - 0.5 % Final    Gran # (ANC) 10/24/2024 3.7  1.8 - 7.7 K/uL Final    Immature Grans (Abs) 10/24/2024 0.03  0.00 - 0.04 K/uL Final    Comment: Mild elevation in  immature granulocytes is non specific and   can be seen in a variety of conditions including stress response,   acute inflammation, trauma and pregnancy. Correlation with other   laboratory and clinical findings is essential.      Lymph # 10/24/2024 2.0  1.0 - 4.8 K/uL Final    Mono # 10/24/2024 0.7  0.3 - 1.0 K/uL Final    Eos # 10/24/2024 0.2  0.0 - 0.5 K/uL Final    Baso # 10/24/2024 0.07  0.00 - 0.20 K/uL Final    nRBC 10/24/2024 0  0 /100 WBC Final    Gran % 10/24/2024 55.7  38.0 - 73.0 % Final    Lymph % 10/24/2024 29.0  18.0 - 48.0 % Final    Mono % 10/24/2024 10.6  4.0 - 15.0 % Final    Eosinophil % 10/24/2024 3.3  0.0 - 8.0 % Final    Basophil % 10/24/2024 1.0  0.0 - 1.9 % Final    Differential Method 10/24/2024 Automated   Final    Cholesterol 10/24/2024 130  120 - 199 mg/dL Final    Comment: The National Cholesterol Education Program (NCEP) has set the  following guidelines (reference ranges) for Cholesterol:  Optimal.....................<200 mg/dL  Borderline High.............200-239 mg/dL  High........................> or = 240 mg/dL      Triglycerides 10/24/2024 77  30 - 150 mg/dL Final    Comment: The National Cholesterol Education Program (NCEP) has set the  following guidelines (reference values) for triglycerides:  Normal......................<150 mg/dL  Borderline High.............150-199 mg/dL  High........................200-499 mg/dL      HDL 10/24/2024 68  40 - 75 mg/dL Final    Comment: The National Cholesterol Education Program (NCEP) has set the  following guidelines (reference values) for HDL Cholesterol:  Low...............<40 mg/dL  Optimal...........>60 mg/dL      LDL Cholesterol 10/24/2024 46.6 (L)  63.0 - 159.0 mg/dL Final    Comment: The National Cholesterol Education Program (NCEP) has set the  following guidelines (reference values) for LDL Cholesterol:  Optimal.......................<130 mg/dL  Borderline High...............130-159 mg/dL  High..........................160-189  mg/dL  Very High.....................>190 mg/dL      HDL/Cholesterol Ratio 10/24/2024 52.3 (H)  20.0 - 50.0 % Final    Total Cholesterol/HDL Ratio 10/24/2024 1.9 (L)  2.0 - 5.0 Final    Non-HDL Cholesterol 10/24/2024 62  mg/dL Final    Comment: Risk category and Non-HDL cholesterol goals:  Coronary heart disease (CHD)or equivalent (10-year risk of CHD >20%):  Non-HDL cholesterol goal     <130 mg/dL  Two or more CHD risk factors and 10-year risk of CHD <= 20%:  Non-HDL cholesterol goal     <160 mg/dL  0 to 1 CHD risk factor:  Non-HDL cholesterol goal     <190 mg/dL         Assessment & Plan:     Assessment & Plan     Assessed weight gain and encouraged continued walking routine   Evaluated sleep patterns; considered mild sleep aid but deferred due to patient's preference   Reviewed blood pressure management, noting good control on current regimen   Analyzed recent lab results, confirming normal blood count, kidney function, and well-controlled cholesterol levels   Considered bone density study but deferred based on patient's preference and lack of risk factors   Evaluated for potential acid reflux, noting past episode resolved without ongoing issues    HYPERLIPIDEMIA:   Explained significance of LDL cholesterol levels and effectiveness of current statin therapy.   Continued atorvastatin for cholesterol management.    RSV VACCINATION:   Discussed potential benefits of RSV vaccination for patient's age group.   Laurie to get RSV vaccine at local [facility] pharmacy.    EXERCISE:   Laurie to continue current walking routine at [place].    ALLERGIES:   Recommend avoiding perfume use to potentially reduce allergy symptoms.   Started Astelin (azelastine) nasal spray: 2 sprays in each nostril twice daily for sinus symptoms.   Continued Zyrtec (cetirizine) daily for allergy management.   Continued Flonase (fluticasone) nasal spray for allergy management.    HYPERTENSION:   Continued amlodipine for blood pressure  management.   Continued valsartan for blood pressure management.    MEDICATIONS/SUPPLEMENTS:   Continued regular aspirin (325mg) daily, as previously recommended by another provider.    FOLLOW UP:   Follow up in 6 months.         Follow up in about 6 months (around 4/30/2025).     Antonino Mesa MD

## 2024-11-03 PROBLEM — J31.0 CHRONIC RHINITIS: Status: ACTIVE | Noted: 2024-11-03

## 2024-12-01 NOTE — TELEPHONE ENCOUNTER
No care due was identified.  [facility] Embedded Care Due Messages. Reference number: [id number].   12/01/2024 11:36:04 AM CST

## 2024-12-02 RX ORDER — VALSARTAN 80 MG/1
80 TABLET ORAL
Qty: 90 TABLET | Refills: 3 | Status: SHIPPED | OUTPATIENT
Start: 2024-12-02

## 2024-12-02 NOTE — TELEPHONE ENCOUNTER
Refill Decision Note   Laurie Hicks  is requesting a refill authorization.  Brief Assessment and Rationale for Refill:  Approve     Medication Therapy Plan:         Comments:     Note composed:5:21 AM 12/02/2024

## 2024-12-15 NOTE — TELEPHONE ENCOUNTER
No care due was identified.  [facility] Embedded Care Due Messages. Reference number: [id number].   12/14/2024 7:13:40 PM CST

## 2024-12-16 RX ORDER — ATORVASTATIN CALCIUM 40 MG/1
40 TABLET, FILM COATED ORAL
Qty: 90 TABLET | Refills: 3 | Status: SHIPPED | OUTPATIENT
Start: 2024-12-16

## 2024-12-16 NOTE — TELEPHONE ENCOUNTER
Refill Decision Note   Laurie Hicks  is requesting a refill authorization.  Brief Assessment and Rationale for Refill:  Approve     Medication Therapy Plan:         Comments:     Note composed:9:57 AM 12/16/2024             Appointments     Last Visit   10/31/2024 Antonino Mesa MD   Next Visit   5/6/2025 Antonino Mesa MD

## 2025-02-21 DIAGNOSIS — Z00.00 ENCOUNTER FOR MEDICARE ANNUAL WELLNESS EXAM: ICD-10-CM

## 2025-04-29 ENCOUNTER — LAB VISIT (OUTPATIENT)
Dept: LAB | Facility: HOSPITAL | Age: 80
End: 2025-04-29
Attending: INTERNAL MEDICINE
Payer: MEDICARE

## 2025-04-29 DIAGNOSIS — E78.49 OTHER HYPERLIPIDEMIA: ICD-10-CM

## 2025-04-29 DIAGNOSIS — R79.9 ABNORMAL FINDING OF BLOOD CHEMISTRY, UNSPECIFIED: ICD-10-CM

## 2025-04-29 DIAGNOSIS — I10 PRIMARY HYPERTENSION: ICD-10-CM

## 2025-04-29 LAB
ABSOLUTE EOSINOPHIL (OHS): 0.22 K/UL
ABSOLUTE MONOCYTE (OHS): 0.74 K/UL (ref 0.3–1)
ABSOLUTE NEUTROPHIL COUNT (OHS): 4.61 K/UL (ref 1.8–7.7)
ALBUMIN SERPL BCP-MCNC: 3.5 G/DL (ref 3.5–5.2)
ALP SERPL-CCNC: 114 UNIT/L (ref 40–150)
ALT SERPL W/O P-5'-P-CCNC: 51 UNIT/L (ref 10–44)
ANION GAP (OHS): 7 MMOL/L (ref 8–16)
AST SERPL-CCNC: 57 UNIT/L (ref 11–45)
BASOPHILS # BLD AUTO: 0.07 K/UL
BASOPHILS NFR BLD AUTO: 0.9 %
BILIRUB SERPL-MCNC: 1.1 MG/DL (ref 0.1–1)
BUN SERPL-MCNC: 12 MG/DL (ref 8–23)
CALCIUM SERPL-MCNC: 9.3 MG/DL (ref 8.7–10.5)
CHLORIDE SERPL-SCNC: 105 MMOL/L (ref 95–110)
CHOLEST SERPL-MCNC: 140 MG/DL (ref 120–199)
CHOLEST/HDLC SERPL: 1.9 {RATIO} (ref 2–5)
CO2 SERPL-SCNC: 30 MMOL/L (ref 23–29)
CREAT SERPL-MCNC: 0.7 MG/DL (ref 0.5–1.4)
EAG (OHS): 105 MG/DL (ref 68–131)
ERYTHROCYTE [DISTWIDTH] IN BLOOD BY AUTOMATED COUNT: 12.6 % (ref 11.5–14.5)
GFR SERPLBLD CREATININE-BSD FMLA CKD-EPI: >60 ML/MIN/1.73/M2
GLUCOSE SERPL-MCNC: 77 MG/DL (ref 70–110)
HBA1C MFR BLD: 5.3 % (ref 4–5.6)
HCT VFR BLD AUTO: 42.7 % (ref 37–48.5)
HDLC SERPL-MCNC: 72 MG/DL (ref 40–75)
HDLC SERPL: 51.4 % (ref 20–50)
HGB BLD-MCNC: 13.9 GM/DL (ref 12–16)
IMM GRANULOCYTES # BLD AUTO: 0.02 K/UL (ref 0–0.04)
IMM GRANULOCYTES NFR BLD AUTO: 0.3 % (ref 0–0.5)
LDLC SERPL CALC-MCNC: 55.2 MG/DL (ref 63–159)
LYMPHOCYTES # BLD AUTO: 1.93 K/UL (ref 1–4.8)
MCH RBC QN AUTO: 30.5 PG (ref 27–31)
MCHC RBC AUTO-ENTMCNC: 32.6 G/DL (ref 32–36)
MCV RBC AUTO: 94 FL (ref 82–98)
NONHDLC SERPL-MCNC: 68 MG/DL
NUCLEATED RBC (/100WBC) (OHS): 0 /100 WBC
PLATELET # BLD AUTO: 223 K/UL (ref 150–450)
PMV BLD AUTO: 11.8 FL (ref 9.2–12.9)
POTASSIUM SERPL-SCNC: 4.2 MMOL/L (ref 3.5–5.1)
PROT SERPL-MCNC: 6.9 GM/DL (ref 6–8.4)
RBC # BLD AUTO: 4.56 M/UL (ref 4–5.4)
RELATIVE EOSINOPHIL (OHS): 2.9 %
RELATIVE LYMPHOCYTE (OHS): 25.4 % (ref 18–48)
RELATIVE MONOCYTE (OHS): 9.7 % (ref 4–15)
RELATIVE NEUTROPHIL (OHS): 60.8 % (ref 38–73)
SODIUM SERPL-SCNC: 142 MMOL/L (ref 136–145)
TRIGL SERPL-MCNC: 64 MG/DL (ref 30–150)
WBC # BLD AUTO: 7.59 K/UL (ref 3.9–12.7)

## 2025-04-29 PROCEDURE — 36415 COLL VENOUS BLD VENIPUNCTURE: CPT

## 2025-04-29 PROCEDURE — 82310 ASSAY OF CALCIUM: CPT

## 2025-04-29 PROCEDURE — 82465 ASSAY BLD/SERUM CHOLESTEROL: CPT

## 2025-04-29 PROCEDURE — 84460 ALANINE AMINO (ALT) (SGPT): CPT

## 2025-04-29 PROCEDURE — 85025 COMPLETE CBC W/AUTO DIFF WBC: CPT

## 2025-04-29 PROCEDURE — 83036 HEMOGLOBIN GLYCOSYLATED A1C: CPT

## 2025-05-06 ENCOUNTER — LAB VISIT (OUTPATIENT)
Dept: LAB | Facility: HOSPITAL | Age: 80
End: 2025-05-06
Attending: INTERNAL MEDICINE
Payer: MEDICARE

## 2025-05-06 ENCOUNTER — OFFICE VISIT (OUTPATIENT)
Dept: INTERNAL MEDICINE | Facility: CLINIC | Age: 80
End: 2025-05-06
Payer: MEDICARE

## 2025-05-06 VITALS
OXYGEN SATURATION: 97 % | HEIGHT: 61 IN | HEART RATE: 70 BPM | TEMPERATURE: 97 F | SYSTOLIC BLOOD PRESSURE: 120 MMHG | DIASTOLIC BLOOD PRESSURE: 70 MMHG | BODY MASS INDEX: 25.68 KG/M2 | RESPIRATION RATE: 16 BRPM | WEIGHT: 136 LBS

## 2025-05-06 DIAGNOSIS — J31.0 CHRONIC RHINITIS: ICD-10-CM

## 2025-05-06 DIAGNOSIS — R79.89 LFT ELEVATION: ICD-10-CM

## 2025-05-06 DIAGNOSIS — I10 PRIMARY HYPERTENSION: Primary | ICD-10-CM

## 2025-05-06 DIAGNOSIS — I70.0 AORTIC ATHEROSCLEROSIS: ICD-10-CM

## 2025-05-06 DIAGNOSIS — E78.49 OTHER HYPERLIPIDEMIA: ICD-10-CM

## 2025-05-06 LAB
HAV IGM SERPL QL IA: NORMAL
HBV SURFACE AG SERPL QL IA: NORMAL
HCV AB SERPL QL IA: NORMAL

## 2025-05-06 PROCEDURE — 87340 HEPATITIS B SURFACE AG IA: CPT

## 2025-05-06 PROCEDURE — 99999 PR PBB SHADOW E&M-EST. PATIENT-LVL IV: CPT | Mod: PBBFAC,,, | Performed by: INTERNAL MEDICINE

## 2025-05-06 PROCEDURE — 86709 HEPATITIS A IGM ANTIBODY: CPT

## 2025-05-06 PROCEDURE — 36415 COLL VENOUS BLD VENIPUNCTURE: CPT | Mod: PO

## 2025-05-06 PROCEDURE — 99214 OFFICE O/P EST MOD 30 MIN: CPT | Mod: S$PBB,,, | Performed by: INTERNAL MEDICINE

## 2025-05-06 PROCEDURE — 99214 OFFICE O/P EST MOD 30 MIN: CPT | Mod: PBBFAC,PO | Performed by: INTERNAL MEDICINE

## 2025-05-06 PROCEDURE — 86803 HEPATITIS C AB TEST: CPT

## 2025-05-06 RX ORDER — ATORVASTATIN CALCIUM 40 MG/1
40 TABLET, FILM COATED ORAL DAILY
Qty: 90 TABLET | Refills: 3 | Status: SHIPPED | OUTPATIENT
Start: 2025-05-06

## 2025-05-06 RX ORDER — LEVOCETIRIZINE DIHYDROCHLORIDE 5 MG/1
5 TABLET, FILM COATED ORAL NIGHTLY
Qty: 30 TABLET | Refills: 11 | Status: SHIPPED | OUTPATIENT
Start: 2025-05-06 | End: 2026-05-06

## 2025-05-06 RX ORDER — METRONIDAZOLE 7.5 MG/G
CREAM TOPICAL 2 TIMES DAILY PRN
COMMUNITY
End: 2025-05-06 | Stop reason: SDUPTHER

## 2025-05-06 RX ORDER — METRONIDAZOLE 7.5 MG/G
CREAM TOPICAL 2 TIMES DAILY PRN
Qty: 45 G | Refills: 0 | Status: SHIPPED | OUTPATIENT
Start: 2025-05-06

## 2025-05-06 RX ORDER — AMLODIPINE BESYLATE 2.5 MG/1
2.5 TABLET ORAL DAILY
Qty: 90 TABLET | Refills: 3 | Status: SHIPPED | OUTPATIENT
Start: 2025-05-06 | End: 2026-05-06

## 2025-05-06 RX ORDER — VALSARTAN 80 MG/1
80 TABLET ORAL DAILY
Qty: 90 TABLET | Refills: 3 | Status: SHIPPED | OUTPATIENT
Start: 2025-05-06

## 2025-05-06 NOTE — PROGRESS NOTES
Subjective:       Patient ID: Laurie Hicks is a 79 y.o. female.    Chief Complaint: Hypertension (6 mos wlabs. )    History of Present Illness    CHIEF COMPLAINT:  Laurie presents today for follow-up of chronic medical conditions which include hypertension, hyperlipidemia, chronic rhinitis, and GERD.  She reports reflux symptoms have resolved.  She is not currently using pantoprazole.  She has received supplies of this medication from an ENT specialist who is also a relative.  He felt she was having laryngopharyngeal reflux.    ESSENTIAL HYPERTENSION:  The patient does not routinely monitor blood pressure.  Current blood pressure medications include amlodipine 2.5 mg daily, valsartan 80 mg daily and clonidine 0.1 mg twice daily as needed for elevated blood pressures greater than 175/90.  The patient states she is not been using clonidine at all.    CHRONIC RHINITIS:  The patient states she takes Zyrtec daily for management of her rhinitis symptoms.  She states she is no longer taking Astelin nasal spray.  She uses Flonase nasal spray as needed.    MEDICATIONS AND SUPPLEMENTS:  In addition to previously mentioned medications she also uses metronidazole cream for an elbow rash. She reports good medication tolerance. She recently discontinued a hair and nail supplement after taking it for 4-5 months.  She last used the medication in October of 2024.    EXERCISE AND JOINT HEALTH:  She exercises on treadmill daily, completing approximately 1 hour total in 15-minute intervals throughout the day. She reports improvement in joint stiffness since starting this exercise routine, particularly when rising from prolonged sitting.    SLEEP:  She reports no difficulty with sleep, falling asleep immediately upon lying down.    SOCIAL HISTORY:  She reports minimal alcohol consumption of no more than one drink per month, often not finishing the entire drink.    LABS:  Complete blood count was normal. Blood chemistry profile  showed normal electrolytes and kidney function. Blood glucose was within normal range. Liver enzymes (AST and ALT) were mildly elevated. Cholesterol panel showed total cholesterol of 140 mg/dL and LDL of 55 mg/dL.      ROS:  ENT: +post nasal drip  Respiratory: -shortness of breath  Cardiovascular: -chest pain, -upper extremity edema, -lower extremity edema  Gastrointestinal: -diarrhea  Musculoskeletal: +joint stiffness, -limb swelling              Physical Exam  Vitals and nursing note reviewed.   Constitutional:       General: She is not in acute distress.     Appearance: Normal appearance. She is well-developed.   HENT:      Head: Normocephalic and atraumatic.   Eyes:      General: No scleral icterus.     Extraocular Movements: Extraocular movements intact.      Conjunctiva/sclera: Conjunctivae normal.   Neck:      Thyroid: No thyromegaly.      Vascular: No JVD.   Cardiovascular:      Rate and Rhythm: Normal rate and regular rhythm.      Heart sounds: Normal heart sounds. No murmur heard.     No friction rub. No gallop.   Pulmonary:      Effort: Pulmonary effort is normal. No respiratory distress.      Breath sounds: Normal breath sounds. No wheezing or rales.   Abdominal:      General: Bowel sounds are normal.      Palpations: Abdomen is soft. There is no mass.      Tenderness: There is no abdominal tenderness.   Musculoskeletal:         General: No tenderness. Normal range of motion.      Cervical back: Normal range of motion and neck supple.      Right lower leg: No edema.      Left lower leg: No edema.   Lymphadenopathy:      Cervical: No cervical adenopathy.   Skin:     General: Skin is warm and dry.      Findings: No rash.   Neurological:      Mental Status: She is alert and oriented to person, place, and time.   Psychiatric:         Mood and Affect: Mood normal.         Behavior: Behavior normal.           Lab Visit on 04/29/2025   Component Date Value Ref Range Status    Sodium 04/29/2025 142  136 - 145  mmol/L Final    Potassium 04/29/2025 4.2  3.5 - 5.1 mmol/L Final    Chloride 04/29/2025 105  95 - 110 mmol/L Final    CO2 04/29/2025 30 (H)  23 - 29 mmol/L Final    Glucose 04/29/2025 77  70 - 110 mg/dL Final    BUN 04/29/2025 12  8 - 23 mg/dL Final    Creatinine 04/29/2025 0.7  0.5 - 1.4 mg/dL Final    Calcium 04/29/2025 9.3  8.7 - 10.5 mg/dL Final    Protein Total 04/29/2025 6.9  6.0 - 8.4 gm/dL Final    Albumin 04/29/2025 3.5  3.5 - 5.2 g/dL Final    Bilirubin Total 04/29/2025 1.1 (H)  0.1 - 1.0 mg/dL Final    For infants and newborns, interpretation of results should be based   on gestational age, weight and in agreement with clinical   observations.    Premature Infant recommended reference ranges:   0-24 hours:  <8.0 mg/dL   24-48 hours: <12.0 mg/dL   3-5 days:    <15.0 mg/dL   6-29 days:   <15.0 mg/dL    ALP 04/29/2025 114  40 - 150 unit/L Final    AST 04/29/2025 57 (H)  11 - 45 unit/L Final    ALT 04/29/2025 51 (H)  10 - 44 unit/L Final    Anion Gap 04/29/2025 7 (L)  8 - 16 mmol/L Final    eGFR 04/29/2025 >60  >60 mL/min/1.73/m2 Final    Estimated GFR calculated using the CKD-EPI creatinine (2021) equation.    Cholesterol Total 04/29/2025 140  120 - 199 mg/dL Final    The National Cholesterol Education Program (NCEP) has set the  following guidelines (reference ranges) for Cholesterol:  Optimal.....................<200 mg/dL  Borderline High.............200-239 mg/dL  High........................> or = 240 mg/dL    Triglyceride 04/29/2025 64  30 - 150 mg/dL Final    The National Cholesterol Education Program (NCEP) has set the  following guidelines (reference values) for triglycerides:  Normal......................<150 mg/dL  Borderline High.............150-199 mg/dL  High........................200-499 mg/dL    HDL Cholesterol 04/29/2025 72  40 - 75 mg/dL Final    The National Cholesterol Education Program (NCEP) has set the   following guidelines (reference values) for HDL Cholesterol:    Low...............<40 mg/dL   Optimal...........>60 mg/dL    LDL Cholesterol 04/29/2025 55.2 (L)  63.0 - 159.0 mg/dL Final    The National Cholesterol Education Program (NCEP) has set the  following guidelines (reference values) for LDL Cholesterol:  Optimal.......................<130 mg/dL  Borderline High...............130-159 mg/dL  High..........................160-189 mg/dL  Very High.....................>190 mg/dL  LDL calculated using the Friedewald equation.    HDL/Cholesterol Ratio 04/29/2025 51.4 (H)  20.0 - 50.0 % Final    Cholesterol/HDL Ratio 04/29/2025 1.9 (L)  2.0 - 5.0 Final    Non HDL Cholesterol 04/29/2025 68  mg/dL Final    Risk category and Non-HDL cholesterol goals:  Coronary heart disease (CHD)or equivalent (10-year risk of CHD >20%):  Non-HDL cholesterol goal     <130 mg/dL  Two or more CHD risk factors and 10-year risk of CHD <= 20%:  Non-HDL cholesterol goal     <160 mg/dL  0 to 1 CHD risk factor:  Non-HDL cholesterol goal     <190 mg/dL    Hemoglobin A1c 04/29/2025 5.3  4.0 - 5.6 % Final    ADA Screening Guidelines:  5.7-6.4%  Consistent with prediabetes  >=6.5%  Consistent with diabetes    High levels of fetal hemoglobin interfere with the HbA1C  assay. Heterozygous hemoglobin variants (HbS, HgC, etc)do  not significantly interfere with this assay.   However, presence of multiple variants may affect accuracy.    Estimated Average Glucose 04/29/2025 105  68 - 131 mg/dL Final    WBC 04/29/2025 7.59  3.90 - 12.70 K/uL Final    RBC 04/29/2025 4.56  4.00 - 5.40 M/uL Final    HGB 04/29/2025 13.9  12.0 - 16.0 gm/dL Final    HCT 04/29/2025 42.7  37.0 - 48.5 % Final    MCV 04/29/2025 94  82 - 98 fL Final    MCH 04/29/2025 30.5  27.0 - 31.0 pg Final    MCHC 04/29/2025 32.6  32.0 - 36.0 g/dL Final    RDW 04/29/2025 12.6  11.5 - 14.5 % Final    Platelet Count 04/29/2025 223  150 - 450 K/uL Final    MPV 04/29/2025 11.8  9.2 - 12.9 fL Final    Nucleated RBC 04/29/2025 0  <=0 /100 WBC Final    Neut %  04/29/2025 60.8  38 - 73 % Final    Lymph % 04/29/2025 25.4  18 - 48 % Final    Mono % 04/29/2025 9.7  4 - 15 % Final    Eos % 04/29/2025 2.9  <=8 % Final    Basophil % 04/29/2025 0.9  <=1.9 % Final    Imm Grans % 04/29/2025 0.3  0.0 - 0.5 % Final    Neut # 04/29/2025 4.61  1.8 - 7.7 K/uL Final    Lymph # 04/29/2025 1.93  1 - 4.8 K/uL Final    Mono # 04/29/2025 0.74  0.3 - 1 K/uL Final    Eos # 04/29/2025 0.22  <=0.5 K/uL Final    Baso # 04/29/2025 0.07  <=0.2 K/uL Final    Imm Grans # 04/29/2025 0.02  0.00 - 0.04 K/uL Final    Mild elevation in immature granulocytes is non specific and can be seen in a variety of conditions including stress response, acute inflammation, trauma and pregnancy. Correlation with other laboratory and clinical findings is essential.       Assessment & Plan:     Assessment & Plan     Noted mild elevation in liver enzyme levels (AST, ALT) for the first time.   Potential causes: recent viral infections, medication effects, or fatty liver.   Ruled out alcohol use as contributing factor.   Suspect recent use of nail and hair supplement may be cause of elevated liver enzymes.   Cholesterol levels well-controlled on current statin therapy.    ESSENTIAL HYPERTENSION:   Blood pressure is well controlled at 120/70, pulse 70, with current medication regimen.   Explained that hypertension is often asymptomatic, emphasizing the importance of regular check-ups and medication adherence.   Prescribed Amlodipine for 90 days.    HYPERLIPIDEMIA:   Cholesterol levels are well controlled with Atorvastatin (total cholesterol 140, LDL 55).   Explained that hyperlipidemia is often asymptomatic, emphasizing the importance of regular monitoring and medication adherence.   Prescribed Atorvastatin for 90 days.    FATTY LIVER:   Noted mild elevation in liver enzymes (AST and ALT).   Discussed potential causes including diabetes, hyperlipidemia, obesity, and alcohol use.   Ordered abdominal ultrasound as a  non-invasive diagnostic method, hepatitis A, B, and C screening, and repeat Comprehensive Metabolic Panel.   Instructed patient to complete ordered labs today (non-fasting).   Advised that management includes controlling diabetes, hyperlipidemia, and weight.    DERMATITIS:   Rash on elbow is effectively managed with metronidazole cream.   Prescribed Metronidazole cream (45 grams) for continued treatment.    FOLLOW-UP:   Laurie to continue regular treadmill exercise routine, which helps with flexibility and overall well-being.         Follow up in about 6 months (around 11/6/2025).     Antonino Mesa MD

## 2025-05-14 ENCOUNTER — HOSPITAL ENCOUNTER (OUTPATIENT)
Dept: RADIOLOGY | Facility: HOSPITAL | Age: 80
Discharge: HOME | End: 2025-05-14
Attending: INTERNAL MEDICINE
Payer: MEDICARE

## 2025-05-14 DIAGNOSIS — R79.89 LFT ELEVATION: ICD-10-CM

## 2025-05-14 PROCEDURE — 76700 US EXAM ABDOM COMPLETE: CPT | Mod: TC

## 2025-05-14 PROCEDURE — 76700 US EXAM ABDOM COMPLETE: CPT | Mod: 26,,, | Performed by: RADIOLOGY

## 2025-05-15 ENCOUNTER — TELEPHONE (OUTPATIENT)
Dept: INTERNAL MEDICINE | Facility: CLINIC | Age: 80
End: 2025-05-15
Payer: COMMERCIAL

## 2025-05-15 ENCOUNTER — RESULTS FOLLOW-UP (OUTPATIENT)
Dept: INTERNAL MEDICINE | Facility: CLINIC | Age: 80
End: 2025-05-15
Payer: COMMERCIAL

## 2025-05-15 NOTE — LETTER
May 15, 2025    Laurie Hicks  [address] [facility] [address]       [facility]  [address] [place] [address].  [facility] [address]  Phone: [phone]  Fax: [phone] Dear Mrs. Laurie Hicks:    Below are the results from your recent visit:    Resulted Orders   Hepatitis B Surface Antigen   Result Value Ref Range    Hep BsAg Interp Non-Reactive Non-Reactive   Hepatitis A Antibody, IgM   Result Value Ref Range    Hep A IgM Interp Non-Reactive Non-Reactive   Hepatitis C Antibody   Result Value Ref Range    Hep C Ab Interp Non-Reactive Non-Reactive   US Abdomen Complete    Narrative    EXAMINATION:  US ABDOMEN COMPLETE    CLINICAL HISTORY:  Other specified abnormal findings of blood chemistry    TECHNIQUE:  Complete abdominal ultrasound (including pancreas, liver, gallbladder, common bile duct, spleen, aorta, IVC, and kidneys) was performed.    COMPARISON:  None    FINDINGS:  Pancreas: The visualized portions of the pancreas appear normal.    Aorta: No aneurysm.    Liver: Measures 12.3 cm, normal in size.  Homogeneous parenchymal echotexture. No focal lesions. The HRI measures 0.8.    Gallbladder: Contracted limiting the evaluation.  Possible gallstone present.    Biliary system: 3 mm common bile duct.  No intrahepatic ductal dilatation.    Inferior vena cava: Normal in appearance.    Right kidney: 9.2 cm. No hydronephrosis.  Simple cyst measure 2.4 x 2.6 x 2.3 cm.    Left kidney: 9.1 cm. No hydronephrosis.    Spleen: Measures 8.2 x 3.2 cm.  Normal in size with homogeneous echotexture.    Miscellaneous: No ascites.      Impression    Contracted gallbladder limiting the evaluation.  Possible gallstone present.  Recommend repeat gallbladder ultrasound when patient is NPO if clinical concerns persist.    Right simple renal cyst.    Electronically signed by resident: Eden Noe  Date:    05/14/2025  Time:    09:38    Electronically signed by: Hunter Spangler  MD  Date:    05/14/2025  Time:    10:31     Your lab results are normal.  Please don't hesitate to call our office if you have any questions or concerns.    Sincerely,    Antonino Mesa MD.  Henrique/lb

## 2025-05-15 NOTE — PROGRESS NOTES
No liver abnormalities were noted on ultrasound.  A simple cyst of the right kidney was incidentally noted.  No further evaluation is necessary.  A possible gallstone was present.

## 2025-05-15 NOTE — TELEPHONE ENCOUNTER
The patient's abdominal ultrasound was negative for any acute abnormalities.  Views of the liver were normal.  A possible gallstone was noted.  This does not require any further evaluation unless the patient becomes symptomatic with right upper quadrant abdominal pain suggesting acute gallbladder inflammation.  A simple cyst was noted involving the right kidney.  This normally does not require any treatment.

## 2025-05-15 NOTE — TELEPHONE ENCOUNTER
I was on phone w/ msJustina Sullivan , discussing  ct results  And she wanted your comments on her ultrasound.     She is not having any pain, so she isnt sure  If any testing needed.    Please send me  your recommendations or  Results note.  See urology?     Thanks yinka

## 2025-05-15 NOTE — TELEPHONE ENCOUNTER
Left msg to return call and said I would mail her  Results as well.                                 Results letter mailed

## 2025-05-15 NOTE — TELEPHONE ENCOUNTER
I gave her drs comments on labs/ ultrasound.    I told her also mailed results letter for her to have  For her records.    She expressed understanding.

## 2025-05-15 NOTE — LETTER
May 15, 2025    Laurie Hicks  [address] [facility] [address]       [facility]  [address] [place] [address].  [facility] [address]  Phone: [phone]  Fax: [phone] Dear Mrs. Laurie Hicks:    Below are the results from your recent visit:    Resulted Orders   Hepatitis B Surface Antigen   Result Value Ref Range    Hep BsAg Interp Non-Reactive Non-Reactive   Hepatitis A Antibody, IgM   Result Value Ref Range    Hep A IgM Interp Non-Reactive Non-Reactive   Hepatitis C Antibody   Result Value Ref Range    Hep C Ab Interp Non-Reactive Non-Reactive   US Abdomen Complete    Narrative    EXAMINATION:  US ABDOMEN COMPLETE    CLINICAL HISTORY:  Other specified abnormal findings of blood chemistry    TECHNIQUE:  Complete abdominal ultrasound (including pancreas, liver, gallbladder, common bile duct, spleen, aorta, IVC, and kidneys) was performed.    COMPARISON:  None    FINDINGS:  Pancreas: The visualized portions of the pancreas appear normal.    Aorta: No aneurysm.    Liver: Measures 12.3 cm, normal in size.  Homogeneous parenchymal echotexture. No focal lesions. The HRI measures 0.8.    Gallbladder: Contracted limiting the evaluation.  Possible gallstone present.    Biliary system: 3 mm common bile duct.  No intrahepatic ductal dilatation.    Inferior vena cava: Normal in appearance.    Right kidney: 9.2 cm. No hydronephrosis.  Simple cyst measure 2.4 x 2.6 x 2.3 cm.    Left kidney: 9.1 cm. No hydronephrosis.    Spleen: Measures 8.2 x 3.2 cm.  Normal in size with homogeneous echotexture.    Miscellaneous: No ascites.      Impression    Contracted gallbladder limiting the evaluation.  Possible gallstone present.  Recommend repeat gallbladder ultrasound when patient is NPO if clinical concerns persist.    Right simple renal cyst.    Electronically signed by resident: Eden Noe  Date:    05/14/2025  Time:    09:38    Electronically signed by: Hunter Spangler  MD  Date:    05/14/2025  Time:    10:31     Your results are within an acceptable range.  Please don't hesitate to call our office if you have any questions or concerns.      Sincerely,      Antonino Mesa MD  Dg/lb

## 2025-05-15 NOTE — TELEPHONE ENCOUNTER
----- Message from Whitney sent at 5/15/2025  4:47 PM CDT -----  Contact: [phone]@[email or web address]  Patient is returning a phone call. Yes Who left a message for the patient: Sofi Choi MADoes patient know what this is regarding:  Yes Would you like a call back, or a response through your [facility] portal?:   Call back Comments:

## 2025-06-06 ENCOUNTER — TELEPHONE (OUTPATIENT)
Dept: INTERNAL MEDICINE | Facility: CLINIC | Age: 80
End: 2025-06-06
Payer: COMMERCIAL

## 2025-06-26 DIAGNOSIS — I10 PRIMARY HYPERTENSION: ICD-10-CM

## 2025-06-26 RX ORDER — AMLODIPINE BESYLATE 2.5 MG/1
2.5 TABLET ORAL
Qty: 90 TABLET | Refills: 3 | Status: SHIPPED | OUTPATIENT
Start: 2025-06-26

## 2025-06-26 NOTE — TELEPHONE ENCOUNTER
No care due was identified.  [facility] Embedded Care Due Messages. Reference number: [id number].   6/26/2025 12:46:43 PM CDT

## 2025-06-26 NOTE — TELEPHONE ENCOUNTER
Refill Decision Note   Laurie Hicks  is requesting a refill authorization.  Brief Assessment and Rationale for Refill:  Approve     Medication Therapy Plan:        Comments:     Note composed:1:00 PM 06/26/2025

## 2025-08-05 ENCOUNTER — TELEPHONE (OUTPATIENT)
Dept: INTERNAL MEDICINE | Facility: CLINIC | Age: 80
End: 2025-08-05
Payer: COMMERCIAL

## 2025-08-05 DIAGNOSIS — I87.2 VENOUS INSUFFICIENCY OF LOWER EXTREMITY: Primary | ICD-10-CM

## 2025-08-05 RX ORDER — CIPROFLOXACIN 500 MG/1
500 TABLET, FILM COATED ORAL 2 TIMES DAILY
Qty: 14 TABLET | Refills: 0 | Status: SHIPPED | OUTPATIENT
Start: 2025-08-05 | End: 2025-08-12

## 2025-08-05 NOTE — TELEPHONE ENCOUNTER
Lov 5/6/25    I returned patient call.    Going on trip w/ friends, To [place].  She is wondering if can get antibiotic to bring  On trip to use if get uti.  ok??    Not leaving till November.    Also Asking for order for 2 compression stockings to  Wear for travel and all the walking.    Iram for rx.   Print stockings order.

## 2025-08-05 NOTE — TELEPHONE ENCOUNTER
Order for compression stockings has been completed.    Prescription order for Cipro has been sent to the patient's pharmacy as requested.

## 2025-08-05 NOTE — TELEPHONE ENCOUNTER
Copied from CRM #[id number]. Topic: General Inquiry - Patient Advice  >> Aug 5, 2025 11:05 AM Jackie wrote:  Type:  Needs Medical Advice    Who Called: Laurie Hicks    Symptoms (please be specific):    How long has patient had these symptoms:    Pharmacy name and phone #:    Would the patient rather a call back or a response via [facility]? phone  Best Call Back Number: [phone]  Additional Information: patient would like a call back from nurse in regards having some questions on medication, patient is going on vacation and would like to know what meds to bring. Thank you.

## 2025-08-06 NOTE — TELEPHONE ENCOUNTER
Told her cipro sent to pharmacy and what symptoms  To look for on her tri before she starts.    Mentioned staying hydrated w/water and  Bringing cranberry pills to use as need too.    She will  stockings order.  is in drawer for her.

## 2025-08-12 ENCOUNTER — LAB VISIT (OUTPATIENT)
Dept: LAB | Facility: HOSPITAL | Age: 80
End: 2025-08-12
Attending: INTERNAL MEDICINE
Payer: MEDICARE

## 2025-08-12 DIAGNOSIS — E78.49 OTHER HYPERLIPIDEMIA: ICD-10-CM

## 2025-08-12 DIAGNOSIS — I10 PRIMARY HYPERTENSION: ICD-10-CM

## 2025-08-12 DIAGNOSIS — R79.89 LFT ELEVATION: ICD-10-CM

## 2025-08-12 LAB
ALBUMIN SERPL BCP-MCNC: 3.7 G/DL (ref 3.5–5.2)
ALP SERPL-CCNC: 100 UNIT/L (ref 40–150)
ALT SERPL W/O P-5'-P-CCNC: 29 UNIT/L (ref 0–55)
ANION GAP (OHS): 5 MMOL/L (ref 8–16)
AST SERPL-CCNC: 43 UNIT/L (ref 0–50)
BILIRUB SERPL-MCNC: 1.2 MG/DL (ref 0.1–1)
BUN SERPL-MCNC: 10 MG/DL (ref 8–23)
CALCIUM SERPL-MCNC: 9.3 MG/DL (ref 8.7–10.5)
CHLORIDE SERPL-SCNC: 105 MMOL/L (ref 95–110)
CHOLEST SERPL-MCNC: 138 MG/DL (ref 120–199)
CHOLEST/HDLC SERPL: 2 {RATIO} (ref 2–5)
CO2 SERPL-SCNC: 28 MMOL/L (ref 23–29)
CREAT SERPL-MCNC: 0.8 MG/DL (ref 0.5–1.4)
GFR SERPLBLD CREATININE-BSD FMLA CKD-EPI: >60 ML/MIN/1.73/M2
GLUCOSE SERPL-MCNC: 82 MG/DL (ref 70–110)
HDLC SERPL-MCNC: 69 MG/DL (ref 40–75)
HDLC SERPL: 50 % (ref 20–50)
LDLC SERPL CALC-MCNC: 53.2 MG/DL (ref 63–159)
NONHDLC SERPL-MCNC: 69 MG/DL
POTASSIUM SERPL-SCNC: 4.1 MMOL/L (ref 3.5–5.1)
PROT SERPL-MCNC: 6.8 GM/DL (ref 6–8.4)
SODIUM SERPL-SCNC: 138 MMOL/L (ref 136–145)
TRIGL SERPL-MCNC: 79 MG/DL (ref 30–150)

## 2025-08-12 PROCEDURE — 36415 COLL VENOUS BLD VENIPUNCTURE: CPT

## 2025-08-12 PROCEDURE — 82040 ASSAY OF SERUM ALBUMIN: CPT

## 2025-08-12 PROCEDURE — 80061 LIPID PANEL: CPT

## 2025-08-26 ENCOUNTER — TELEPHONE (OUTPATIENT)
Dept: INTERNAL MEDICINE | Facility: CLINIC | Age: 80
End: 2025-08-26
Payer: COMMERCIAL

## 2025-08-26 DIAGNOSIS — R82.90 ABNORMAL URINE: Primary | ICD-10-CM

## 2025-08-26 RX ORDER — CIPROFLOXACIN 500 MG/1
500 TABLET, FILM COATED ORAL EVERY 12 HOURS
Qty: 10 TABLET | Refills: 0 | Status: SHIPPED | OUTPATIENT
Start: 2025-08-26 | End: 2025-08-31

## 2025-08-27 ENCOUNTER — LAB VISIT (OUTPATIENT)
Dept: LAB | Facility: HOSPITAL | Age: 80
End: 2025-08-27
Payer: COMMERCIAL

## 2025-08-27 DIAGNOSIS — R82.90 ABNORMAL URINE: ICD-10-CM

## 2025-08-27 LAB
BACTERIA #/AREA URNS AUTO: NORMAL /HPF
BILIRUB UR QL STRIP.AUTO: NEGATIVE
CLARITY UR: CLEAR
COLOR UR AUTO: YELLOW
GLUCOSE UR QL STRIP: NEGATIVE
HGB UR QL STRIP: NEGATIVE
KETONES UR QL STRIP: NEGATIVE
LEUKOCYTE ESTERASE UR QL STRIP: ABNORMAL
MICROSCOPIC COMMENT: NORMAL
NITRITE UR QL STRIP: NEGATIVE
PH UR STRIP: 6 [PH]
PROT UR QL STRIP: NEGATIVE
RBC #/AREA URNS AUTO: 1 /HPF (ref 0–4)
SP GR UR STRIP: 1.01
SQUAMOUS #/AREA URNS AUTO: <1 /HPF
UROBILINOGEN UR STRIP-ACNC: NEGATIVE EU/DL
WBC #/AREA URNS AUTO: 2 /HPF (ref 0–5)

## 2025-08-27 PROCEDURE — 81003 URINALYSIS AUTO W/O SCOPE: CPT

## 2025-08-28 LAB
HOLD SPECIMEN: NORMAL
HOLD SPECIMEN: NORMAL

## 2025-08-29 ENCOUNTER — TELEPHONE (OUTPATIENT)
Dept: INTERNAL MEDICINE | Facility: CLINIC | Age: 80
End: 2025-08-29
Payer: COMMERCIAL

## 2025-09-02 ENCOUNTER — TELEPHONE (OUTPATIENT)
Dept: INTERNAL MEDICINE | Facility: CLINIC | Age: 80
End: 2025-09-02
Payer: COMMERCIAL

## 2025-09-02 DIAGNOSIS — R10.30 LOWER ABDOMINAL PAIN: ICD-10-CM

## 2025-09-02 DIAGNOSIS — N39.0 URINARY TRACT INFECTION WITHOUT HEMATURIA, SITE UNSPECIFIED: Primary | ICD-10-CM

## 2025-09-03 ENCOUNTER — OFFICE VISIT (OUTPATIENT)
Dept: UROLOGY | Facility: CLINIC | Age: 80
End: 2025-09-03
Payer: MEDICARE

## 2025-09-03 VITALS
WEIGHT: 141.13 LBS | BODY MASS INDEX: 26.66 KG/M2 | DIASTOLIC BLOOD PRESSURE: 76 MMHG | SYSTOLIC BLOOD PRESSURE: 153 MMHG | HEART RATE: 71 BPM

## 2025-09-03 DIAGNOSIS — N30.00 ACUTE CYSTITIS WITHOUT HEMATURIA: Primary | ICD-10-CM

## 2025-09-03 PROCEDURE — 99999 PR PBB SHADOW E&M-EST. PATIENT-LVL III: CPT | Mod: PBBFAC,,, | Performed by: UROLOGY

## 2025-09-03 PROCEDURE — 99204 OFFICE O/P NEW MOD 45 MIN: CPT | Mod: S$PBB,,, | Performed by: UROLOGY

## 2025-09-03 PROCEDURE — 99213 OFFICE O/P EST LOW 20 MIN: CPT | Mod: PBBFAC | Performed by: UROLOGY
